# Patient Record
Sex: MALE | Race: OTHER | Employment: UNEMPLOYED | URBAN - METROPOLITAN AREA
[De-identification: names, ages, dates, MRNs, and addresses within clinical notes are randomized per-mention and may not be internally consistent; named-entity substitution may affect disease eponyms.]

---

## 2024-04-23 DIAGNOSIS — Z13.0 SCREENING FOR IRON DEFICIENCY ANEMIA: ICD-10-CM

## 2024-04-23 DIAGNOSIS — Z13.220 LIPID SCREENING: ICD-10-CM

## 2024-04-23 DIAGNOSIS — R73.03 PREDIABETES: ICD-10-CM

## 2024-04-23 DIAGNOSIS — Z12.5 PROSTATE CANCER SCREENING: ICD-10-CM

## 2024-04-23 LAB
ALBUMIN SERPL-MCNC: 4.5 G/DL (ref 3.5–4.6)
ALP SERPL-CCNC: 98 U/L (ref 35–104)
ALT SERPL-CCNC: 18 U/L (ref 0–41)
ANION GAP SERPL CALCULATED.3IONS-SCNC: 12 MEQ/L (ref 9–15)
AST SERPL-CCNC: 19 U/L (ref 0–40)
BASOPHILS # BLD: 0.1 K/UL (ref 0–0.2)
BASOPHILS NFR BLD: 0.7 %
BILIRUB SERPL-MCNC: 0.5 MG/DL (ref 0.2–0.7)
BUN SERPL-MCNC: 14 MG/DL (ref 6–20)
CALCIUM SERPL-MCNC: 9.6 MG/DL (ref 8.5–9.9)
CHLORIDE SERPL-SCNC: 104 MEQ/L (ref 95–107)
CHOLEST SERPL-MCNC: 188 MG/DL (ref 0–199)
CO2 SERPL-SCNC: 28 MEQ/L (ref 20–31)
CREAT SERPL-MCNC: 1.13 MG/DL (ref 0.7–1.2)
EOSINOPHIL # BLD: 0.3 K/UL (ref 0–0.7)
EOSINOPHIL NFR BLD: 2.5 %
ERYTHROCYTE [DISTWIDTH] IN BLOOD BY AUTOMATED COUNT: 12.3 % (ref 11.5–14.5)
GLOBULIN SER CALC-MCNC: 2.6 G/DL (ref 2.3–3.5)
GLUCOSE SERPL-MCNC: 127 MG/DL (ref 70–99)
HCT VFR BLD AUTO: 52.1 % (ref 42–52)
HDLC SERPL-MCNC: 57 MG/DL (ref 40–59)
HGB BLD-MCNC: 17.1 G/DL (ref 14–18)
LDL CHOLESTEROL CALCULATED: 98 MG/DL (ref 0–129)
LYMPHOCYTES # BLD: 2.5 K/UL (ref 1–4.8)
LYMPHOCYTES NFR BLD: 25.7 %
MCH RBC QN AUTO: 31.8 PG (ref 27–31.3)
MCHC RBC AUTO-ENTMCNC: 32.8 % (ref 33–37)
MCV RBC AUTO: 97 FL (ref 79–92.2)
MONOCYTES # BLD: 0.9 K/UL (ref 0.2–0.8)
MONOCYTES NFR BLD: 9.5 %
NEUTROPHILS # BLD: 6 K/UL (ref 1.4–6.5)
NEUTS SEG NFR BLD: 61.4 %
PLATELET # BLD AUTO: 242 K/UL (ref 130–400)
POTASSIUM SERPL-SCNC: 4.5 MEQ/L (ref 3.4–4.9)
PROT SERPL-MCNC: 7.1 G/DL (ref 6.3–8)
PSA SERPL-MCNC: 0.81 NG/ML (ref 0–4)
RBC # BLD AUTO: 5.37 M/UL (ref 4.7–6.1)
SODIUM SERPL-SCNC: 144 MEQ/L (ref 135–144)
TRIGLYCERIDE, FASTING: 163 MG/DL (ref 0–150)
WBC # BLD AUTO: 9.8 K/UL (ref 4.8–10.8)

## 2024-04-29 ENCOUNTER — HOSPITAL ENCOUNTER (OUTPATIENT)
Dept: PHYSICAL THERAPY | Age: 59
Setting detail: THERAPIES SERIES
Discharge: HOME | End: 2024-04-29

## 2024-04-29 PROCEDURE — 97110 THERAPEUTIC EXERCISES: CPT

## 2024-04-29 ASSESSMENT — PAIN DESCRIPTION - PAIN TYPE: TYPE: CHRONIC PAIN

## 2024-04-29 ASSESSMENT — PAIN DESCRIPTION - DESCRIPTORS: DESCRIPTORS: SHARP

## 2024-04-29 ASSESSMENT — PAIN DESCRIPTION - LOCATION: LOCATION: BACK

## 2024-04-29 ASSESSMENT — PAIN DESCRIPTION - ORIENTATION: ORIENTATION: LEFT

## 2024-04-29 ASSESSMENT — PAIN SCALES - GENERAL: PAINLEVEL_OUTOF10: 8

## 2024-04-29 NOTE — PROGRESS NOTES
[place] [address] [facility] [place] [address]  Phone: [phone]      Date: 2024  Patient: Gustavo Villa  : 1965   Confirmed: Yes  MRN: [id number]  Referring Provider: Kaycee Pal PA-C    Medical Diagnosis: Neck pain, musculoskeletal [M54.2]  Paresthesia of left arm [R20.2]  Osteoarthritis of spine with radiculopathy, lumbar region [M47.26]       Treatment Diagnosis: increased pain, decreased pain free ROM & strength, decreased ability to perform functional mobility tasks & ADLs, decreased functional endurance & balance, & impaired postural awareness    Visit Information:  Insurance: Payor: [employer] [place] / Plan: [employer] [place] / Product Type: *No Product type* /   PT Visit Information  Onset Date:  (years)  PT Insurance Information: [employer]  Total # of Visits Approved: 30 (insurance changes 24)  Total # of Visits to Date: 3  No Show: 0  Canceled Appointment: 0  Progress Note Counter: 3/8-    Subjective Information:  Subjective: iPad Interpretter: [employer] #[id number]. Pt reports last session was good. States he has some LBP. Reports it is a sharp pain. Pain is mainly the left side.  HEP Compliance:  [x] Good [] Fair [] Poor [] Reports not doing due to:    Pain Screening  Patient Currently in Pain: Yes  Pain Assessment: 0-10  Pain Level: 8  Pain Type: Chronic pain  Pain Location: Back  Pain Orientation: Left  Pain Descriptors: Sharp    Treatment:  Exercises:  Exercises  Exercise 2: S/L IT band stretch 3 x 20-30\" on L  Exercise 3: Step Ups x 10 (8 in step)  love  Exercise 4: TA iso with bridges x 12-5\"  Exercise 5: LTR x 10 love 5\" holds  Exercise 6: SKTC 3 x20\" love (with towel)  Exercise 7: Pball rollouts x 10 x 5\" flexion/ toward the R/L  Exercise 8: SLR x 10 love -3-5\"  Exercise 9: HIp Abduction x 10 love  Exercise 11: Supine Chest Stretch with towel roll  x 10 -5-10\"  Exercise 20: HEP: cont current + SKTC, SLR     Objective Measures:

## 2024-05-13 ENCOUNTER — HOSPITAL ENCOUNTER (OUTPATIENT)
Dept: PHYSICAL THERAPY | Age: 59
Setting detail: THERAPIES SERIES
Discharge: HOME | End: 2024-05-13
Payer: MEDICAID

## 2024-05-13 NOTE — PROGRESS NOTES
Therapy                            Cancellation/No-show Note      Date: 2024  Patient: Gustavo Coyne (58 y.o. male)  : 1965  MRN:  [id number]  Referring Physician: Kaycee Pal PA-C    Medical Diagnosis: Neck pain, musculoskeletal [M54.2]  Paresthesia of left arm [R20.2]  Osteoarthritis of spine with radiculopathy, lumbar region [M47.26]      Visit Information:  Visits to Date 3   No Show/Cancelled Appts: 0       For today's appointment patient:  [x]  Cancelled  []  Rescheduled appointment  []  No-show   []  Called pt to remind of next appointment     Reason given by patient:  []  Patient ill  [x]  Conflicting appointment  []  No transportation    []  Conflict with work  []  No reason given  []  Other:      [x] Pt has future appointments scheduled, no follow up needed  [] Pt requests to be on hold.    Reason:   If > 2 weeks please discuss with therapist.  [] Therapist to call pt for follow up     Comments:       Signature: Electronically signed by Ernesto Shook PTA on 24 at 2:39 PM EDT

## 2024-05-14 ENCOUNTER — HOSPITAL ENCOUNTER (OUTPATIENT)
Dept: PHYSICAL THERAPY | Age: 59
Setting detail: THERAPIES SERIES
Discharge: HOME | End: 2024-05-14
Payer: MEDICAID

## 2024-05-14 PROCEDURE — 97110 THERAPEUTIC EXERCISES: CPT

## 2024-05-14 PROCEDURE — 97140 MANUAL THERAPY 1/> REGIONS: CPT

## 2024-05-14 ASSESSMENT — PAIN SCALES - GENERAL: PAINLEVEL_OUTOF10: 6

## 2024-05-14 ASSESSMENT — PAIN DESCRIPTION - ORIENTATION: ORIENTATION: RIGHT;LEFT

## 2024-05-14 ASSESSMENT — PAIN DESCRIPTION - DESCRIPTORS: DESCRIPTORS: SQUEEZING;PRESSURE

## 2024-05-14 ASSESSMENT — PAIN DESCRIPTION - LOCATION: LOCATION: NECK;SHOULDER

## 2024-05-14 ASSESSMENT — PAIN DESCRIPTION - PAIN TYPE: TYPE: CHRONIC PAIN

## 2024-05-14 NOTE — PROGRESS NOTES
</=25% VC's with exercises/activities in order to demonstrate improved postural awareness. In progress   LTG 5 The patient will have a decrease in NDI & Oswestry scores >/=6 points in order to increase functional activity tolerance In progress   LTG 6 The patient will self-report consistent ability to stand & ambulate >/= 30 minutes with LRD with minimal to no deviations in order to demonstrate improved functional endurance & gait pattern. In progress   LTG 7 The patient will ascend/descend a flight of stairs with reciprocal stair pattern independently with LRD with rails in order to safely get in/out of house. In progress            Plan:  Frequency/Duration:  Plan  Plan Frequency: 2xs/wk  Plan weeks: 4-6 weeks  Current Treatment Recommendations: Strengthening, ROM, Balance training, Gait training, Stair training, Functional mobility training, Transfer training, Neuromuscular re-education, Safety education & training, Home exercise program, Dry needling, Therapeutic activities, Patient/Caregiver education & training, Manual, ADL/Self-care training, IADL training, Group Therapy, Equipment evaluation, education, & procurement, Pain management, Endurance training, Modalities, Positioning  Modalities: Heat/Cold, Mechanical Traction, Ultrasound, E-stim - unattended, E-stim - manual, Vasopneumatic Device  Pt to continue current HEP.  See objective section for any therapeutic exercise changes, additions or modifications this date.    Therapy Time:      PT Individual Minutes  Time In: 1417  Time Out: 1501  Minutes: 44  Timed Code Treatment Minutes: 44 Minutes  Procedure Minutes: 0  Timed Activity Minutes Units   Ther Ex 33 2   Manual  11 1     Electronically signed by Damian Wilson PTA on 5/14/24 at 3:07 PM EDT

## 2024-05-16 ENCOUNTER — HOSPITAL ENCOUNTER (OUTPATIENT)
Dept: PHYSICAL THERAPY | Age: 59
Setting detail: THERAPIES SERIES
Discharge: HOME | End: 2024-05-16
Payer: MEDICAID

## 2024-05-16 NOTE — PROGRESS NOTES
Therapy                            Cancellation/No-show Note    Date: 2024  Patient: Gustavo Coyne (58 y.o. male)  : 1965  MRN:  [id number]  Referring Physician: Kaycee Pal PA-C    Medical Diagnosis: Neck pain, musculoskeletal [M54.2]  Paresthesia of left arm [R20.2]  Osteoarthritis of spine with radiculopathy, lumbar region [M47.26]      Visit Information:  Insurance: Payor: [employer] / Plan: [employer] [place] [place] / Product Type: *No Product type* /   Visits to Date: 3   No Show/Cancelled Appts:       For today's appointment patient:  []  Cancelled  []  Rescheduled appointment  [x]  No-show   []  Called pt to remind of next appointment     Reason given by patient:  []  Patient ill  []  Conflicting appointment  []  No transportation    []  Conflict with work  []  No reason given  []  Other:      [x] Pt has future appointments scheduled, no follow up needed  [] Pt requests to be on hold.    Reason:   If > 2 weeks please discuss with therapist.  [] Therapist to call pt for follow up     Comments:   pt is Mongolian speaking    Signature: Electronically signed by Pinky Fernández PT on 24 at 11:27 AM EDT

## 2024-05-17 RX ORDER — POLYETHYLENE GLYCOL 3350, SODIUM CHLORIDE, SODIUM BICARBONATE, POTASSIUM CHLORIDE 420; 11.2; 5.72; 1.48 G/4L; G/4L; G/4L; G/4L
4000 POWDER, FOR SOLUTION ORAL ONCE
Qty: 4000 ML | Refills: 0 | Status: SHIPPED | OUTPATIENT
Start: 2024-05-17 | End: 2024-05-17

## 2024-05-21 ENCOUNTER — HOSPITAL ENCOUNTER (OUTPATIENT)
Dept: PHYSICAL THERAPY | Age: 59
Setting detail: THERAPIES SERIES
Discharge: HOME | End: 2024-05-21
Payer: MEDICAID

## 2024-05-21 PROCEDURE — 97140 MANUAL THERAPY 1/> REGIONS: CPT

## 2024-05-21 PROCEDURE — 97110 THERAPEUTIC EXERCISES: CPT

## 2024-05-21 ASSESSMENT — PAIN SCALES - GENERAL: PAINLEVEL_OUTOF10: 6

## 2024-05-21 ASSESSMENT — PAIN DESCRIPTION - LOCATION: LOCATION: NECK;SHOULDER

## 2024-05-23 ENCOUNTER — HOSPITAL ENCOUNTER (OUTPATIENT)
Dept: PHYSICAL THERAPY | Age: 59
Setting detail: THERAPIES SERIES
Discharge: HOME | End: 2024-05-23
Payer: MEDICAID

## 2024-05-23 PROCEDURE — 97140 MANUAL THERAPY 1/> REGIONS: CPT

## 2024-05-23 PROCEDURE — 97110 THERAPEUTIC EXERCISES: CPT

## 2024-05-23 ASSESSMENT — PAIN DESCRIPTION - DESCRIPTORS: DESCRIPTORS: STABBING;DISCOMFORT

## 2024-05-23 ASSESSMENT — PAIN DESCRIPTION - ORIENTATION: ORIENTATION: MID

## 2024-05-23 ASSESSMENT — PAIN DESCRIPTION - PAIN TYPE: TYPE: CHRONIC PAIN

## 2024-05-23 ASSESSMENT — PAIN SCALES - GENERAL: PAINLEVEL_OUTOF10: 5

## 2024-05-23 ASSESSMENT — PAIN DESCRIPTION - LOCATION: LOCATION: NECK

## 2024-05-23 NOTE — PROGRESS NOTES
pec stretch (arms in horz abduction): 20\" x 5  Exercise 20: HEP: cont current       Manual:   Manual Therapy  Soft Tissue Mobilizaton: left upper trap and periscapulars x 8 min  Other: cervical PROM flex, flex w/ rot, love SB, ext, and ext w/ rot x 2 min  Treatment Reasoning  Limitations addressed: Joint motion, Tissue extensibility, Painful spasm      Objective Measures:        LTG 1 Current Status:: 5/23/24: on-going, compliant with current  HEP  LTG 2 Current Status:: 5/23/24: Cervical ROM WFL, with discomfort felt with right sided rotation.       Assessment:   Body Structures, Functions, Activity Limitations Requiring Skilled Therapeutic Intervention: Decreased functional mobility , Decreased ADL status, Decreased endurance, Increased pain, Decreased ROM, Decreased strength, Decreased posture, Decreased balance  Assessment: Continues to reports decreasing pain levels with manual techniques and mobility exercises performed in doorway. Displays  increased tightness in his cervical paraspinals and upper traps. Notes pain decreased today from a 5/10 to 3/10. Notes slight sharp pains in L side of cervical spine with R cervical rotation.  Treatment Diagnosis: increased pain, decreased pain free ROM & strength, decreased ability to perform functional mobility tasks & ADLs, decreased functional endurance & balance, & impaired postural awareness  Therapy Prognosis: Fair, Good       Patient Education: [x] NA       Post-Pain Assessment:       Pain Rating (0-10 pain scale):   3/10   Location and pain description same as pre-treatment unless indicated.   Action: [x] NA   [] Perform HEP  [] Meds as prescribed  [] Modalities as prescribed   [] Call Physician     GOALS   Patient Goal(s): Patient Goals : 'don't feel pain'    Long Term Goals Completed by 4-6 weeks Goal Status   LTG 1 Patient will be independent/compliant with PT HEP in order to demonstrate self management of symptoms upon D/C In progress   LTG 2 The patient will

## 2024-05-28 ENCOUNTER — HOSPITAL ENCOUNTER (OUTPATIENT)
Dept: PHYSICAL THERAPY | Age: 59
Setting detail: THERAPIES SERIES
Discharge: HOME | End: 2024-05-28
Payer: MEDICAID

## 2024-05-28 ENCOUNTER — PREP FOR PROCEDURE (OUTPATIENT)
Dept: GASTROENTEROLOGY | Age: 59
End: 2024-05-28

## 2024-05-28 PROCEDURE — 97110 THERAPEUTIC EXERCISES: CPT

## 2024-05-28 PROCEDURE — 97140 MANUAL THERAPY 1/> REGIONS: CPT

## 2024-05-28 RX ORDER — SODIUM CHLORIDE 9 MG/ML
INJECTION, SOLUTION INTRAVENOUS CONTINUOUS
Status: CANCELLED | OUTPATIENT
Start: 2024-05-28

## 2024-05-28 RX ORDER — SODIUM CHLORIDE 0.9 % (FLUSH) 0.9 %
5-40 SYRINGE (ML) INJECTION EVERY 12 HOURS SCHEDULED
Status: CANCELLED | OUTPATIENT
Start: 2024-05-28

## 2024-05-28 RX ORDER — SODIUM CHLORIDE 9 MG/ML
INJECTION, SOLUTION INTRAVENOUS PRN
Status: CANCELLED | OUTPATIENT
Start: 2024-05-28

## 2024-05-28 RX ORDER — SODIUM CHLORIDE 0.9 % (FLUSH) 0.9 %
5-40 SYRINGE (ML) INJECTION PRN
Status: CANCELLED | OUTPATIENT
Start: 2024-05-28

## 2024-05-28 ASSESSMENT — PAIN DESCRIPTION - DESCRIPTORS: DESCRIPTORS: STABBING

## 2024-05-28 ASSESSMENT — PAIN SCALES - GENERAL: PAINLEVEL_OUTOF10: 6

## 2024-05-28 ASSESSMENT — PAIN DESCRIPTION - ORIENTATION: ORIENTATION: MID

## 2024-05-28 ASSESSMENT — PAIN DESCRIPTION - PAIN TYPE: TYPE: CHRONIC PAIN

## 2024-05-28 ASSESSMENT — PAIN DESCRIPTION - LOCATION: LOCATION: NECK

## 2024-05-28 NOTE — PROGRESS NOTES
horz abduction): 20\" x 5  Exercise 12: Cervical AROM 5\" x 10 each (Sb/Ext)  Exercise 14: Thoracic rotation seated x 10 x 5\"  Exercise 20: HEP: cont current       Manual:   Manual Therapy  Soft Tissue Mobilizaton: left upper trap and periscapulars x 8 min  Treatment Reasoning  Limitations addressed: Joint motion, Tissue extensibility, Painful spasm       *Indicates exercise, modality, or manual techniques to be initiated when appropriate    Objective Measures:        Assessment:   Body Structures, Functions, Activity Limitations Requiring Skilled Therapeutic Intervention: Decreased functional mobility , Decreased ADL status, Decreased endurance, Increased pain, Decreased ROM, Decreased strength, Decreased posture, Decreased balance  Assessment: Light modifications/ progressions made this date with good results, Gustavo reports decreasing pain throughout tx with activities and greater relief from manual techniques.  Manual had mod pain relief at conclusion of tx.  Treatment Diagnosis: increased pain, decreased pain free ROM & strength, decreased ability to perform functional mobility tasks & ADLs, decreased functional endurance & balance, & impaired postural awareness  Therapy Prognosis: Fair, Good       Patient Education: [x] NA       Post-Pain Assessment:       Pain Rating (0-10 pain scale):   2-3/10   Location and pain description same as pre-treatment unless indicated.   Action: [] NA   [x] Perform HEP  [] Meds as prescribed  [] Modalities as prescribed   [] Call Physician     GOALS   Patient Goal(s): Patient Goals : 'don't feel pain'       Long Term Goals Completed by 4-6 weeks Goal Status   LTG 1 Patient will be independent/compliant with PT HEP in order to demonstrate self management of symptoms upon D/C In progress   LTG 2 The patient will demo improved cervical, lumbar, & bilateral hip AROM >/=5-10* in order to increase ease with functional mobility tasks & ADL's In progress   LTG 3 The patient will

## 2024-05-30 ENCOUNTER — OFFICE VISIT (OUTPATIENT)
Dept: FAMILY MEDICINE CLINIC | Age: 59
End: 2024-05-30
Payer: MEDICAID

## 2024-05-30 ENCOUNTER — TELEPHONE (OUTPATIENT)
Dept: FAMILY MEDICINE CLINIC | Age: 59
End: 2024-05-30

## 2024-05-30 ENCOUNTER — HOSPITAL ENCOUNTER (OUTPATIENT)
Dept: PHYSICAL THERAPY | Age: 59
Setting detail: THERAPIES SERIES
Discharge: HOME | End: 2024-05-30
Payer: MEDICAID

## 2024-05-30 VITALS
HEART RATE: 72 BPM | DIASTOLIC BLOOD PRESSURE: 80 MMHG | HEIGHT: 66 IN | WEIGHT: 204 LBS | OXYGEN SATURATION: 97 % | SYSTOLIC BLOOD PRESSURE: 125 MMHG | BODY MASS INDEX: 32.78 KG/M2 | TEMPERATURE: 97.6 F

## 2024-05-30 DIAGNOSIS — M54.9 MID BACK PAIN: ICD-10-CM

## 2024-05-30 DIAGNOSIS — G89.29 CHRONIC PAIN OF RIGHT KNEE: ICD-10-CM

## 2024-05-30 DIAGNOSIS — M54.2 NECK PAIN, MUSCULOSKELETAL: Primary | ICD-10-CM

## 2024-05-30 DIAGNOSIS — R20.2 PARESTHESIA OF LEFT ARM: ICD-10-CM

## 2024-05-30 DIAGNOSIS — M25.561 CHRONIC PAIN OF RIGHT KNEE: ICD-10-CM

## 2024-05-30 DIAGNOSIS — M25.512 ACUTE PAIN OF BOTH SHOULDERS: ICD-10-CM

## 2024-05-30 DIAGNOSIS — M25.50 ARTHRALGIA, UNSPECIFIED JOINT: ICD-10-CM

## 2024-05-30 DIAGNOSIS — R26.9 ABNORMALITY OF GAIT AND MOBILITY: ICD-10-CM

## 2024-05-30 DIAGNOSIS — M47.26 OSTEOARTHRITIS OF SPINE WITH RADICULOPATHY, LUMBAR REGION: ICD-10-CM

## 2024-05-30 DIAGNOSIS — M17.11 PRIMARY OSTEOARTHRITIS OF RIGHT KNEE: Primary | ICD-10-CM

## 2024-05-30 DIAGNOSIS — M25.511 ACUTE PAIN OF BOTH SHOULDERS: ICD-10-CM

## 2024-05-30 LAB
REASON FOR REJECTION: NORMAL
REJECTED TEST: NORMAL

## 2024-05-30 PROCEDURE — G8427 DOCREV CUR MEDS BY ELIG CLIN: HCPCS | Performed by: PHYSICIAN ASSISTANT

## 2024-05-30 PROCEDURE — 3017F COLORECTAL CA SCREEN DOC REV: CPT | Performed by: PHYSICIAN ASSISTANT

## 2024-05-30 PROCEDURE — 1036F TOBACCO NON-USER: CPT | Performed by: PHYSICIAN ASSISTANT

## 2024-05-30 PROCEDURE — 99214 OFFICE O/P EST MOD 30 MIN: CPT | Performed by: PHYSICIAN ASSISTANT

## 2024-05-30 PROCEDURE — G8417 CALC BMI ABV UP PARAM F/U: HCPCS | Performed by: PHYSICIAN ASSISTANT

## 2024-05-30 PROCEDURE — 96372 THER/PROPH/DIAG INJ SC/IM: CPT | Performed by: PHYSICIAN ASSISTANT

## 2024-05-30 RX ORDER — TIZANIDINE 4 MG/1
4 TABLET ORAL EVERY 8 HOURS PRN
Qty: 40 TABLET | Refills: 1 | Status: SHIPPED | OUTPATIENT
Start: 2024-05-30 | End: 2024-05-30 | Stop reason: SDUPTHER

## 2024-05-30 RX ORDER — TIZANIDINE 4 MG/1
4 TABLET ORAL EVERY 8 HOURS PRN
Qty: 40 TABLET | Refills: 1 | Status: SHIPPED | OUTPATIENT
Start: 2024-05-30 | End: 2024-05-31

## 2024-05-30 RX ORDER — KETOROLAC TROMETHAMINE 30 MG/ML
60 INJECTION, SOLUTION INTRAMUSCULAR; INTRAVENOUS ONCE
Status: COMPLETED | OUTPATIENT
Start: 2024-05-30 | End: 2024-05-30

## 2024-05-30 RX ORDER — HYDROCODONE BITARTRATE AND ACETAMINOPHEN 5; 325 MG/1; MG/1
1 TABLET ORAL EVERY 6 HOURS PRN
Qty: 28 TABLET | Refills: 0 | Status: SHIPPED | OUTPATIENT
Start: 2024-05-30 | End: 2024-06-06

## 2024-05-30 RX ADMIN — KETOROLAC TROMETHAMINE 60 MG: 30 INJECTION, SOLUTION INTRAMUSCULAR; INTRAVENOUS at 12:40

## 2024-05-30 ASSESSMENT — ENCOUNTER SYMPTOMS: BACK PAIN: 1

## 2024-05-30 NOTE — PROGRESS NOTES
Therapy                            Cancellation/No-show Note      Date: 2024  Patient: Gustavo Coyne (58 y.o. male)  : 1965  MRN:  [id number]  Referring Physician: Kaycee Pal PA-C    Medical Diagnosis: Neck pain, musculoskeletal [M54.2]  Paresthesia of left arm [R20.2]  Osteoarthritis of spine with radiculopathy, lumbar region [M47.26]      Visit Information:  Visits to Date 6   No Show/Cancelled Appts:       For today's appointment patient:  [x]  Cancelled  []  Rescheduled appointment  []  No-show   []  Called pt to remind of next appointment     Reason given by patient:  []  Patient ill  []  Conflicting appointment  []  No transportation    []  Conflict with work  []  No reason given  [x]  Other: Pt arrived to appt with daughter and reports that he fell yesterday and is having increased pain in back, neck, and arms. Pt reports he fell forward landing on his arms and torri his neck. Pt has appt with MD at 1145. After discussion with pt and daughter decided to cx appt this date until seeing MD d/t increased pain following fall. Pt to contact dept after appt to f/u after appt.      [x] Pt has future appointments scheduled, no follow up needed  [] Pt requests to be on hold.    Reason:   If > 2 weeks please discuss with therapist.  [] Therapist to call pt for follow up     Comments:       Signature: Electronically signed by Sylvia Bean PT on 24 at 11:08 AM EDT

## 2024-05-30 NOTE — PROGRESS NOTES
DISCONTD: tiZANidine (ZANAFLEX) 4 MG tablet     Sig: Take 1 tablet by mouth every 8 hours as needed (muscle spasm)     Dispense:  40 tablet     Refill:  1    HYDROcodone-acetaminophen (NORCO) 5-325 MG per tablet     Sig: Take 1 tablet by mouth every 6 hours as needed for Pain for up to 7 days. Intended supply: 7 days. Take lowest dose possible to manage pain Max Daily Amount: 4 tablets     Dispense:  28 tablet     Refill:  0     Reduce doses taken as pain becomes manageable    tiZANidine (ZANAFLEX) 4 MG tablet     Sig: Take 1 tablet by mouth every 8 hours as needed (muscle spasm)     Dispense:  40 tablet     Refill:  1     Medications Discontinued During This Encounter   Medication Reason    tiZANidine (ZANAFLEX) 4 MG tablet REORDER    tiZANidine (ZANAFLEX) 4 MG tablet REORDER     No follow-ups on file.    Kaycee Pal PA-C

## 2024-05-30 NOTE — TELEPHONE ENCOUNTER
Geoff from [facility] called to let PCP know that pt needs to get ESR re done due to specimen being clotted.

## 2024-05-31 ENCOUNTER — TELEPHONE (OUTPATIENT)
Dept: FAMILY MEDICINE CLINIC | Age: 59
End: 2024-05-31

## 2024-05-31 DIAGNOSIS — M62.830 MUSCLE SPASM OF BACK: Primary | ICD-10-CM

## 2024-05-31 LAB
ERYTHROCYTE [SEDIMENTATION RATE] IN BLOOD BY WESTERGREN METHOD: 1 MM (ref 0–20)
RHEUMATOID FACTOR: <10 IU/ML (ref 0–13)

## 2024-05-31 RX ORDER — CYCLOBENZAPRINE HCL 10 MG
10 TABLET ORAL 3 TIMES DAILY PRN
Qty: 40 TABLET | Refills: 1 | Status: SHIPPED | OUTPATIENT
Start: 2024-05-31 | End: 2024-06-10

## 2024-05-31 RX ORDER — CYCLOBENZAPRINE HCL 10 MG
10 TABLET ORAL 3 TIMES DAILY PRN
Qty: 40 TABLET | Refills: 1 | Status: SHIPPED | OUTPATIENT
Start: 2024-05-31 | End: 2024-05-31 | Stop reason: SDUPTHER

## 2024-05-31 NOTE — TELEPHONE ENCOUNTER
nAia from the pharmacy called and stated that they need to hear from the office in regards to a possible drug interaction between hydrocodone and zanaflex   States there could be a reaction to the central nervous system and depression.   Please advise.   Thank you

## 2024-06-01 LAB — NUCLEAR IGG SER QL IA: NORMAL

## 2024-06-04 ENCOUNTER — HOSPITAL ENCOUNTER (OUTPATIENT)
Dept: PHYSICAL THERAPY | Age: 59
Setting detail: THERAPIES SERIES
Discharge: HOME | End: 2024-06-04

## 2024-06-04 DIAGNOSIS — G89.29 CHRONIC PAIN OF RIGHT KNEE: Primary | ICD-10-CM

## 2024-06-04 DIAGNOSIS — R26.9 ABNORMALITY OF GAIT AND MOBILITY: ICD-10-CM

## 2024-06-04 DIAGNOSIS — M25.561 CHRONIC PAIN OF RIGHT KNEE: Primary | ICD-10-CM

## 2024-06-04 DIAGNOSIS — M47.26 OSTEOARTHRITIS OF SPINE WITH RADICULOPATHY, LUMBAR REGION: ICD-10-CM

## 2024-06-04 NOTE — PROGRESS NOTES
Therapy                            Cancellation/No-show Note      Date: 2024  Patient: Gustavo Coyne (58 y.o. male)  : 1965  MRN:  [id number]  Referring Physician: Kaycee Pal PA-C    Medical Diagnosis: Neck pain, musculoskeletal [M54.2]  Paresthesia of left arm [R20.2]  Osteoarthritis of spine with radiculopathy, lumbar region [M47.26]      Visit Information:  Visits to Date 6   No Show/Cancelled Appts:       For today's appointment patient:  [x]  Cancelled  []  Rescheduled appointment  []  No-show   []  Called pt to remind of next appointment     Reason given by patient:  []  Patient ill  []  Conflicting appointment  []  No transportation    []  Conflict with work  []  No reason given  [x]  Other:  MD wants pt to have home healthcare PT. Called to discuss with pt and daughter reports she is unsure if he is getting home health PT and requests to be placed on hold vs d/C until it is figured out.     [] Pt has future appointments scheduled, no follow up needed  [] Pt requests to be on hold.    Reason:   If > 2 weeks please discuss with therapist.  [] Therapist to call pt for follow up     Comments:       Signature: Electronically signed by Sylvia Bean PT on 24 at 4:50 PM EDT

## 2024-06-06 ENCOUNTER — HOSPITAL ENCOUNTER (OUTPATIENT)
Age: 59
Setting detail: OUTPATIENT SURGERY
Discharge: HOME | End: 2024-06-06
Attending: SPECIALIST | Admitting: SPECIALIST
Payer: MEDICAID

## 2024-06-06 ENCOUNTER — ANESTHESIA EVENT (OUTPATIENT)
Dept: ENDOSCOPY | Age: 59
End: 2024-06-06
Payer: MEDICAID

## 2024-06-06 ENCOUNTER — ANESTHESIA (OUTPATIENT)
Dept: ENDOSCOPY | Age: 59
End: 2024-06-06
Payer: MEDICAID

## 2024-06-06 VITALS
OXYGEN SATURATION: 96 % | DIASTOLIC BLOOD PRESSURE: 60 MMHG | BODY MASS INDEX: 32.02 KG/M2 | HEART RATE: 91 BPM | SYSTOLIC BLOOD PRESSURE: 110 MMHG | RESPIRATION RATE: 20 BRPM | TEMPERATURE: 97.3 F | WEIGHT: 204 LBS | HEIGHT: 67 IN

## 2024-06-06 PROBLEM — Z12.11 SCREENING FOR MALIGNANT NEOPLASM OF COLON: Status: ACTIVE | Noted: 2024-06-06

## 2024-06-06 PROCEDURE — 7100000010 HC PHASE II RECOVERY - FIRST 15 MIN: Performed by: SPECIALIST

## 2024-06-06 PROCEDURE — 3700000001 HC ADD 15 MINUTES (ANESTHESIA): Performed by: SPECIALIST

## 2024-06-06 PROCEDURE — 6360000002 HC RX W HCPCS: Performed by: NURSE ANESTHETIST, CERTIFIED REGISTERED

## 2024-06-06 PROCEDURE — 2580000003 HC RX 258: Performed by: SPECIALIST

## 2024-06-06 PROCEDURE — 2580000003 HC RX 258

## 2024-06-06 PROCEDURE — 7100000011 HC PHASE II RECOVERY - ADDTL 15 MIN: Performed by: SPECIALIST

## 2024-06-06 PROCEDURE — 2500000003 HC RX 250 WO HCPCS: Performed by: NURSE ANESTHETIST, CERTIFIED REGISTERED

## 2024-06-06 PROCEDURE — 2709999900 HC NON-CHARGEABLE SUPPLY: Performed by: SPECIALIST

## 2024-06-06 PROCEDURE — 3609027000 HC COLONOSCOPY: Performed by: SPECIALIST

## 2024-06-06 PROCEDURE — 45378 DIAGNOSTIC COLONOSCOPY: CPT | Performed by: SPECIALIST

## 2024-06-06 PROCEDURE — 3700000000 HC ANESTHESIA ATTENDED CARE: Performed by: SPECIALIST

## 2024-06-06 RX ORDER — LIDOCAINE HYDROCHLORIDE 20 MG/ML
INJECTION, SOLUTION INFILTRATION; PERINEURAL PRN
Status: DISCONTINUED | OUTPATIENT
Start: 2024-06-06 | End: 2024-06-06 | Stop reason: SDUPTHER

## 2024-06-06 RX ORDER — PROPOFOL 10 MG/ML
INJECTION, EMULSION INTRAVENOUS PRN
Status: DISCONTINUED | OUTPATIENT
Start: 2024-06-06 | End: 2024-06-06 | Stop reason: SDUPTHER

## 2024-06-06 RX ORDER — SODIUM CHLORIDE 0.9 % (FLUSH) 0.9 %
5-40 SYRINGE (ML) INJECTION EVERY 12 HOURS SCHEDULED
Status: DISCONTINUED | OUTPATIENT
Start: 2024-06-06 | End: 2024-06-06 | Stop reason: HOSPADM

## 2024-06-06 RX ORDER — SODIUM CHLORIDE 0.9 % (FLUSH) 0.9 %
5-40 SYRINGE (ML) INJECTION PRN
Status: DISCONTINUED | OUTPATIENT
Start: 2024-06-06 | End: 2024-06-06 | Stop reason: HOSPADM

## 2024-06-06 RX ORDER — SODIUM CHLORIDE 9 MG/ML
INJECTION, SOLUTION INTRAVENOUS
Status: COMPLETED
Start: 2024-06-06 | End: 2024-06-06

## 2024-06-06 RX ORDER — SODIUM CHLORIDE 9 MG/ML
INJECTION, SOLUTION INTRAVENOUS PRN
Status: DISCONTINUED | OUTPATIENT
Start: 2024-06-06 | End: 2024-06-06 | Stop reason: HOSPADM

## 2024-06-06 RX ORDER — SODIUM CHLORIDE 9 MG/ML
INJECTION, SOLUTION INTRAVENOUS CONTINUOUS
Status: DISCONTINUED | OUTPATIENT
Start: 2024-06-06 | End: 2024-06-06 | Stop reason: HOSPADM

## 2024-06-06 RX ADMIN — PROPOFOL 100 MG: 10 INJECTION, EMULSION INTRAVENOUS at 11:24

## 2024-06-06 RX ADMIN — LIDOCAINE HYDROCHLORIDE 60 MG: 20 INJECTION, SOLUTION INFILTRATION; PERINEURAL at 11:24

## 2024-06-06 RX ADMIN — SODIUM CHLORIDE: 9 INJECTION, SOLUTION INTRAVENOUS at 10:02

## 2024-06-06 RX ADMIN — PROPOFOL 100 MG: 10 INJECTION, EMULSION INTRAVENOUS at 11:33

## 2024-06-06 RX ADMIN — PROPOFOL 100 MG: 10 INJECTION, EMULSION INTRAVENOUS at 11:27

## 2024-06-06 ASSESSMENT — PAIN - FUNCTIONAL ASSESSMENT
PAIN_FUNCTIONAL_ASSESSMENT: NONE - DENIES PAIN
PAIN_FUNCTIONAL_ASSESSMENT: 0-10
PAIN_FUNCTIONAL_ASSESSMENT: ACTIVITIES ARE NOT PREVENTED

## 2024-06-06 ASSESSMENT — PAIN DESCRIPTION - DESCRIPTORS: DESCRIPTORS: CRAMPING

## 2024-06-06 NOTE — H&P
Patient Name: Gustavo Coyne  : 1965  MRN: [id number]  DATE: 24      ENDOSCOPY  History and Physical    Procedure:    [] Diagnostic Colonoscopy       [x] Screening Colonoscopy  [] EGD      [] ERCP      [] EUS       [] Other    [x] Previous office notes/History and Physical reviewed from the patients chart. Please see EMR for further details of HPI. I have examined the patient's status immediately prior to the procedure and:      Indications/HPI:    []Abdominal Pain  []Cancer- GI/Lung  []Fhx of colon CA/polyps  []History of Polyps  []Tavarez’s   []Melena  []Abnormal Imaging  []Dysphagia    []Persistent Pneumonia  []Anemia  []Food Impaction  []History of Polyps  []GI Bleed  []Pulmonary nodule/Mass  []Change in bowel habits []Heartburn/Reflux  []Rectal Bleed (BRBPR)  []Chest Pain - Non Cardiac []Heme (+) Stoo  l[]Ulcers  []Constipation  []Hemoptysis   []Varices  []Diarrhea  []Hypoxemia  []Nausea/Vomiting  []Screening   []Crohns/Colitis  []Other:     Anesthesia:   [x] MAC [] Moderate Sedation   [] General   [] None     ROS: 12 pt Review of Symptoms was negative unless mentioned above    Medications:   Prior to Admission medications    Medication Sig Start Date End Date Taking? Authorizing Provider   cyclobenzaprine (FLEXERIL) 10 MG tablet Take 1 tablet by mouth 3 times daily as needed for Muscle spasms 5/31/24 6/10/24  Kaycee Pal PA-C   HYDROcodone-acetaminophen (NORCO) 5-325 MG per tablet Take 1 tablet by mouth every 6 hours as needed for Pain for up to 7 days. Intended supply: 7 days. Take lowest dose possible to manage pain Max Daily Amount: 4 tablets 24  Kaycee Pal PA-C   gabapentin (NEURONTIN) 800 MG tablet Take 1 tablet by mouth 3 times daily. TAKE 1 TABLET BY MOUTH ONCE DAILY.    Provider, MD Cammie   escitalopram (LEXAPRO) 10 MG tablet Take 1 tablet by mouth daily 24   Kaycee Pal PA-C       Allergies: No Known Allergies     History of

## 2024-06-06 NOTE — ANESTHESIA PRE PROCEDURE
Department of Anesthesiology  Preprocedure Note       Name:  Gustavo Coyne   Age:  58 y.o.  :  1965                                          MRN:  [id number]         Date:  2024      Surgeon: Surgeon(s):  Yesenia To MD    Procedure: Procedure(s):  COLORECTAL CANCER SCREENING, NOT HIGH RISK    Medications prior to admission:   Prior to Admission medications    Medication Sig Start Date End Date Taking? Authorizing Provider   cyclobenzaprine (FLEXERIL) 10 MG tablet Take 1 tablet by mouth 3 times daily as needed for Muscle spasms 5/31/24 6/10/24  Kaycee Pal PA-C   HYDROcodone-acetaminophen (NORCO) 5-325 MG per tablet Take 1 tablet by mouth every 6 hours as needed for Pain for up to 7 days. Intended supply: 7 days. Take lowest dose possible to manage pain Max Daily Amount: 4 tablets 24  Kaycee Pal PA-C   gabapentin (NEURONTIN) 800 MG tablet Take 1 tablet by mouth 3 times daily. TAKE 1 TABLET BY MOUTH ONCE DAILY.    Provider, MD Cammie   escitalopram (LEXAPRO) 10 MG tablet Take 1 tablet by mouth daily 24   Kaycee Pal PA-C       Current medications:    Current Facility-Administered Medications   Medication Dose Route Frequency Provider Last Rate Last Admin    0.9 % sodium chloride infusion   IntraVENous Continuous Yesenia To MD 75 mL/hr at 24 1002 New Bag at 24 1002    sodium chloride flush 0.9 % injection 5-40 mL  5-40 mL IntraVENous 2 times per day Yesenia To MD        sodium chloride flush 0.9 % injection 5-40 mL  5-40 mL IntraVENous PRN Yesenia To MD        0.9 % sodium chloride infusion   IntraVENous PRN Yesenia To MD           Allergies:  No Known Allergies    Problem List:  There is no problem list on file for this patient.      Past Medical History:        Diagnosis Date    Degenerative joint disease (DJD) of lumbar spine     History of 2019 novel coronavirus disease (COVID-19)

## 2024-06-06 NOTE — ANESTHESIA POSTPROCEDURE EVALUATION
Department of Anesthesiology  Postprocedure Note    Patient: Gustavo Coyne  MRN: [id number]  YOB: 1965  Date of evaluation: 6/6/2024    Procedure Summary       Date: 06/06/24 Room / Location: [facility] OR 01 / [facility]    Anesthesia Start: 1121 Anesthesia Stop: 1138    Procedure: COLORECTAL CANCER SCREENING, NOT HIGH RISK Diagnosis:       Colon cancer screening      (Colon cancer screening [Z12.11])    Surgeons: Yesenia To MD Responsible Provider: Lula Ramirez APRN - CRNA    Anesthesia Type: general ASA Status: 2            Anesthesia Type: No value filed.    Chandler Phase I: Chandler Score: 10    Chandler Phase II:      Anesthesia Post Evaluation    Patient location during evaluation: bedside  Patient participation: waiting for patient participation  Level of consciousness: awake and responsive to light touch  Airway patency: patent  Nausea & Vomiting: no nausea and no vomiting  Cardiovascular status: blood pressure returned to baseline and hemodynamically stable  Respiratory status: acceptable  Hydration status: euvolemic  Pain management: adequate        No notable events documented.

## 2024-06-25 ENCOUNTER — OFFICE VISIT (OUTPATIENT)
Dept: FAMILY MEDICINE CLINIC | Age: 59
End: 2024-06-25
Payer: MEDICAID

## 2024-06-25 VITALS
OXYGEN SATURATION: 97 % | SYSTOLIC BLOOD PRESSURE: 116 MMHG | DIASTOLIC BLOOD PRESSURE: 78 MMHG | BODY MASS INDEX: 32.78 KG/M2 | TEMPERATURE: 97.5 F | WEIGHT: 204 LBS | HEIGHT: 66 IN | HEART RATE: 86 BPM

## 2024-06-25 DIAGNOSIS — M25.561 CHRONIC PAIN OF RIGHT KNEE: Primary | ICD-10-CM

## 2024-06-25 DIAGNOSIS — M54.2 NECK PAIN, MUSCULOSKELETAL: ICD-10-CM

## 2024-06-25 DIAGNOSIS — G89.29 CHRONIC PAIN OF RIGHT KNEE: Primary | ICD-10-CM

## 2024-06-25 DIAGNOSIS — M47.26 OSTEOARTHRITIS OF SPINE WITH RADICULOPATHY, LUMBAR REGION: ICD-10-CM

## 2024-06-25 DIAGNOSIS — R26.9 ABNORMALITY OF GAIT AND MOBILITY: ICD-10-CM

## 2024-06-25 DIAGNOSIS — F41.8 ANXIETY WITH DEPRESSION: ICD-10-CM

## 2024-06-25 PROCEDURE — G8427 DOCREV CUR MEDS BY ELIG CLIN: HCPCS | Performed by: PHYSICIAN ASSISTANT

## 2024-06-25 PROCEDURE — 99213 OFFICE O/P EST LOW 20 MIN: CPT | Performed by: PHYSICIAN ASSISTANT

## 2024-06-25 PROCEDURE — G8417 CALC BMI ABV UP PARAM F/U: HCPCS | Performed by: PHYSICIAN ASSISTANT

## 2024-06-25 PROCEDURE — 3017F COLORECTAL CA SCREEN DOC REV: CPT | Performed by: PHYSICIAN ASSISTANT

## 2024-06-25 PROCEDURE — 1036F TOBACCO NON-USER: CPT | Performed by: PHYSICIAN ASSISTANT

## 2024-06-25 RX ORDER — TIZANIDINE 4 MG/1
4 TABLET ORAL 3 TIMES DAILY
COMMUNITY
Start: 2024-06-18

## 2024-06-25 NOTE — PROGRESS NOTES
Subjective  Gustavo Coyne, 58 y.o. male presents today with:  Chief Complaint   Patient presents with    Waiver for LTC.     Patient presents today requesting long term services.        HPI  Patient is here accompanied by his daughter-in-law who provided translation patient primarily speaks Khmer  Has noticed improvement in neck ,  low back, and knee pain with physical therapy needs order to  continue out pt  therapy -insurance will not cover in home therapy  Gabapentin and zanaflex are helpful for pain relief  He is receiving home health 14hrs/wk  States he feels less irritable with Lexapro-however he is having some family discord and would like counseling    Review of Systems      Past Medical History:   Diagnosis Date    Degenerative joint disease (DJD) of lumbar spine 2010    History of 2019 novel coronavirus disease (COVID-19) 2023    Prediabetes 11/2023     Past Surgical History:   Procedure Laterality Date    COLONOSCOPY N/A 6/6/2024    COLORECTAL CANCER SCREENING, NOT HIGH RISK performed by Yesenia To MD at [facility]     Social History     Socioeconomic History    Marital status: Single     Spouse name: Not on file    Number of children: Not on file    Years of education: Not on file    Highest education level: Not on file   Occupational History    Not on file   Tobacco Use    Smoking status: Never     Passive exposure: Never    Smokeless tobacco: Never   Vaping Use    Vaping Use: Never used   Substance and Sexual Activity    Alcohol use: Yes     Comment: occ    Drug use: Never    Sexual activity: Not on file   Other Topics Concern    Not on file   Social History Narrative    Not on file     Social Determinants of Health     Financial Resource Strain: Low Risk  (4/22/2024)    Overall Financial Resource Strain (CARDIA)     Difficulty of Paying Living Expenses: Not hard at all   Food Insecurity: No Food Insecurity (4/22/2024)    Hunger Vital Sign     Worried About Running Out of Food

## 2024-06-27 ENCOUNTER — HOSPITAL ENCOUNTER (OUTPATIENT)
Dept: PHYSICAL THERAPY | Age: 59
Setting detail: THERAPIES SERIES
Discharge: HOME | End: 2024-06-27
Payer: MEDICAID

## 2024-06-27 PROCEDURE — 97163 PT EVAL HIGH COMPLEX 45 MIN: CPT

## 2024-06-27 NOTE — PLAN OF CARE
as of 6/25/24: 1.689 m (5' 6.5\").    Weight as of 6/25/24: 92.5 kg (204 lb).     Observations: Forward head, Rounded shoulders, Protracted Scapula, Increased thoracic kyphosis , Flattened lumbar lordosis , Posterior pelvic tilt    Gait     Description: antalgic gait L LE  Distance: within dept clinical distances   Level of Assistance: independence  Assistive Device: None  Comments: Pt reports primary use of SPC d/t pain though did not bring to appt this date      Balance Screen: BRISENO 38/56    Bed Mobility: modified independence  Comments:increased time/effort required to perform with increased pain     Transfers: modified independence  Comments: increased time/effort required to perform with increased pain     Range of Motion  AROM RUE (degrees)  RUE General AROM: shoulder flex 135, abd 100, ER base of occiput, IR T12 (pain with IR)  AROM LUE (degrees)  LUE General AROM: shoulder flex 140, abd 115, ER base of occiput, IR T12 (pain with IR)  AROM Cervical Spine   Cervical spine general AROM: flex 25, Ext 10, SB L 19, R 14, Rot L 23, R 30 (pain with all mvmt )  AROM Lumbar Spine   Lumbar spine general AROM: flex 50%, Ext, B SB, B rotation 25% (pain with all movments)     Strength  Strength RLE  Comment: 4+/5 Hip IR, ER,ankle DF 4/5 Hip flex, hip abd knee flex, ext 3/5 hip ext  Strength LLE  Comment: 3+/5 grossly except hip flex 4-/5 Hip ext, abd 3/5  Strength RUE  Comment: 4+/5 shoulder except ER 4/5 5/5 elbow  Trunk Strength  Trunk Flexion: 1/5     Palpation: Significant tightness noted B UT, LS, periscapular, thoracic paraspinals, L piriformis/glute, cervical paraspinals, scalenes; tender to palpate B UT, L piriformis lumbar paraspinals     Sensation: not tested    LE Special Tests/Functional Screens: NT d/t pain and difficulty tolerating positioning     Hip Scour            L []+ / [] -   R []+ / [] -   Arley               L []+ / [] -   R []+ / [] -   Leena                    L []+ / [] -   R []+ / [] -     NO

## 2024-07-06 PROBLEM — Z12.11 SCREENING FOR MALIGNANT NEOPLASM OF COLON: Status: RESOLVED | Noted: 2024-06-06 | Resolved: 2024-07-06

## 2024-07-09 ENCOUNTER — HOSPITAL ENCOUNTER (OUTPATIENT)
Dept: PHYSICAL THERAPY | Age: 59
Setting detail: THERAPIES SERIES
Discharge: HOME | End: 2024-07-09
Payer: MEDICAID

## 2024-07-09 PROCEDURE — 97110 THERAPEUTIC EXERCISES: CPT

## 2024-07-09 PROCEDURE — 97140 MANUAL THERAPY 1/> REGIONS: CPT

## 2024-07-09 ASSESSMENT — PAIN DESCRIPTION - DESCRIPTORS: DESCRIPTORS: STABBING

## 2024-07-09 ASSESSMENT — PAIN DESCRIPTION - PAIN TYPE: TYPE: CHRONIC PAIN

## 2024-07-09 ASSESSMENT — PAIN DESCRIPTION - LOCATION: LOCATION: BACK;NECK

## 2024-07-09 ASSESSMENT — PAIN SCALES - GENERAL: PAINLEVEL_OUTOF10: 8

## 2024-07-09 ASSESSMENT — PAIN DESCRIPTION - ORIENTATION: ORIENTATION: MID;UPPER

## 2024-07-09 NOTE — PROGRESS NOTES
[place] [address] [facility] [place] [address]  Phone: [phone]      Date: 2024  Patient: Gustavo Coyne  : 1965   Confirmed: Yes  MRN: [id number]  Referring Provider: Kaycee Pal PA-C    Medical Diagnosis: Chronic pain of right knee [M25.561, G89.29]  Neck pain, musculoskeletal [M54.2]  Abnormality of gait and mobility [R26.9]  Osteoarthritis of spine with radiculopathy, lumbar region [M47.26]       Treatment Diagnosis: LBP, neck pain, B LE pain, B shoulder pain, decreased lumbar, cervical, and B shoulder AROM, decreased UE, LE, and core strength, impaired gait, impaired functional mobility,impaired balance, decreased posture, significant soft tissue restrictions, and decreased activity tolerance    Visit Information:  Insurance: Payor: [employer] [place] / Plan: [employer] [place] / Product Type: *No Product type* /   PT Visit Information  Onset Date:  (years)  PT Insurance Information: United Healthcare Community Plan  Total # of Visits Approved:  (48 units)  Total # of Visits to Date: 2  Plan of Care/Certification Expiration Date: 24  No Show: 0  Canceled Appointment: 0  Progress Note Counter:  (3/48 units by 24)    Subjective Information:  Subjective:  Aramis #[id number]. Pt reports pain \"is regular.\" Pt reports pain is worse in upper back/neck.  HEP Compliance:  not yet est    Pain Screening  Patient Currently in Pain: Yes  Pain Assessment: 0-10  Pain Level: 8  Pain Type: Chronic pain  Pain Location: Back, Neck  Pain Orientation: Mid, Upper  Pain Descriptors: Stabbing    Treatment:  Exercises:  Exercises  Exercise 1: nustep UE/LE seat 9 arms 9 L1 x5 mins  Exercise 5: sink ex: HR, march, hip abd, hip ext, knee flex, mini squat x10 ea  Exercise 9: seated scap retract 5''x10, shrugs 5''x10, posterior rolls 5''x10  Exercise 10: cervical AROM seated 5'' hold at end range x10 ea  Exercise 20: HEP: sink

## 2024-07-15 ENCOUNTER — OFFICE VISIT (OUTPATIENT)
Dept: ORTHOPEDIC SURGERY | Age: 59
End: 2024-07-15
Payer: MEDICAID

## 2024-07-15 VITALS
HEIGHT: 67 IN | OXYGEN SATURATION: 96 % | BODY MASS INDEX: 32.02 KG/M2 | TEMPERATURE: 97.5 F | HEART RATE: 72 BPM | WEIGHT: 204 LBS

## 2024-07-15 DIAGNOSIS — M25.361 INSTABILITY OF RIGHT KNEE JOINT: ICD-10-CM

## 2024-07-15 DIAGNOSIS — M23.91 LOCKING KNEE, RIGHT: Primary | ICD-10-CM

## 2024-07-15 PROCEDURE — G8427 DOCREV CUR MEDS BY ELIG CLIN: HCPCS | Performed by: PHYSICIAN ASSISTANT

## 2024-07-15 PROCEDURE — 1036F TOBACCO NON-USER: CPT | Performed by: PHYSICIAN ASSISTANT

## 2024-07-15 PROCEDURE — G8417 CALC BMI ABV UP PARAM F/U: HCPCS | Performed by: PHYSICIAN ASSISTANT

## 2024-07-15 PROCEDURE — 3017F COLORECTAL CA SCREEN DOC REV: CPT | Performed by: PHYSICIAN ASSISTANT

## 2024-07-15 PROCEDURE — 99203 OFFICE O/P NEW LOW 30 MIN: CPT | Performed by: PHYSICIAN ASSISTANT

## 2024-07-15 RX ORDER — CYCLOBENZAPRINE HCL 10 MG
TABLET ORAL
COMMUNITY
Start: 2024-06-10

## 2024-07-15 SDOH — HEALTH STABILITY: PHYSICAL HEALTH: ON AVERAGE, HOW MANY MINUTES DO YOU ENGAGE IN EXERCISE AT THIS LEVEL?: 30 MIN

## 2024-07-15 SDOH — HEALTH STABILITY: PHYSICAL HEALTH: ON AVERAGE, HOW MANY DAYS PER WEEK DO YOU ENGAGE IN MODERATE TO STRENUOUS EXERCISE (LIKE A BRISK WALK)?: 3 DAYS

## 2024-07-15 ASSESSMENT — ENCOUNTER SYMPTOMS: RESPIRATORY NEGATIVE: 1
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
## 2024-07-15 NOTE — PATIENT INSTRUCTIONS
Please call [phone] to set up an MRI appointment.  I would like to see you back several days after your MRI scan.  Tell them when you are making the appointment that I would like to see you back in the office 1 to 2 days after the MRI scan.

## 2024-07-24 ENCOUNTER — TELEPHONE (OUTPATIENT)
Dept: FAMILY MEDICINE CLINIC | Age: 59
End: 2024-07-24

## 2024-07-24 ENCOUNTER — HOSPITAL ENCOUNTER (OUTPATIENT)
Dept: PHYSICAL THERAPY | Age: 59
Setting detail: THERAPIES SERIES
Discharge: HOME | End: 2024-07-24
Payer: MEDICAID

## 2024-07-24 PROCEDURE — 97110 THERAPEUTIC EXERCISES: CPT

## 2024-07-24 NOTE — TELEPHONE ENCOUNTER
Patient called in stating [employer] is requesting a letter from PCP stating he is not able to work due to his condition.     Thank you.

## 2024-07-24 NOTE — PROGRESS NOTES
[facility]  [address] [facility] [place] [address]  Phone: [phone]      Date: 2024  Patient: Gustavo Coyne  : 1965   Confirmed: Yes  MRN: [id number]  Referring Provider: Kaycee Pal PA-C    Medical Diagnosis: Chronic pain of right knee [M25.561, G89.29]  Neck pain, musculoskeletal [M54.2]  Abnormality of gait and mobility [R26.9]  Osteoarthritis of spine with radiculopathy, lumbar region [M47.26]       Treatment Diagnosis: LBP, neck pain, B LE pain, B shoulder pain, decreased lumbar, cervical, and B shoulder AROM, decreased UE, LE, and core strength, impaired gait, impaired functional mobility,impaired balance, decreased posture, significant soft tissue restrictions, and decreased activity tolerance    Visit Information:  Insurance: Payor: [employer] PL / Plan: [place] / Product Type: *No Product type* /   PT Visit Information  Onset Date:  (years)  PT Insurance Information: United Healthcare Community Plan  Total # of Visits Approved:  (48 units)  Total # of Visits to Date: 3  Plan of Care/Certification Expiration Date: 24  No Show: 0  Canceled Appointment: 0  Progress Note Counter:  (6/48 units by 24)    Subjective Information:  Subjective:  gabe #[id number]. Pt reports pain is \"regular.\" Pt reports some days when he does HEP he feels great and some days he feels \"so so\". Pt reports seeing pain management doctor and they sent him to do some test but they still don't know.  HEP Compliance:  [x] Good [] Fair [] Poor [] Reports not doing due to:    Pain Screening  Patient Currently in Pain: Yes  Pain Level: 7  Pain Type: Chronic pain  Pain Location: Neck, Leg  Pain Orientation: Mid, Upper, Right  Pain Descriptors: Aching    Treatment:  Exercises:  Exercises  Exercise 1: nustep UE/LE seat 9 arms 9 L1 x5 mins  Exercise 3: pball flexion stretches 3 way blue ball  x10 5\"  Exercise 5: sink ex:

## 2024-07-29 ENCOUNTER — HOSPITAL ENCOUNTER (OUTPATIENT)
Dept: MRI IMAGING | Age: 59
Discharge: HOME | End: 2024-07-31
Payer: MEDICAID

## 2024-07-29 DIAGNOSIS — M23.91 LOCKING KNEE, RIGHT: ICD-10-CM

## 2024-07-29 PROCEDURE — 73721 MRI JNT OF LWR EXTRE W/O DYE: CPT

## 2024-07-30 ENCOUNTER — HOSPITAL ENCOUNTER (OUTPATIENT)
Dept: PHYSICAL THERAPY | Age: 59
Setting detail: THERAPIES SERIES
Discharge: HOME | End: 2024-07-30
Payer: MEDICAID

## 2024-07-30 PROCEDURE — 97110 THERAPEUTIC EXERCISES: CPT

## 2024-07-30 PROCEDURE — 97112 NEUROMUSCULAR REEDUCATION: CPT

## 2024-07-30 ASSESSMENT — PAIN DESCRIPTION - DESCRIPTORS: DESCRIPTORS: ACHING;SORE

## 2024-07-30 ASSESSMENT — PAIN SCALES - GENERAL: PAINLEVEL_OUTOF10: 7

## 2024-07-30 ASSESSMENT — PAIN DESCRIPTION - ORIENTATION: ORIENTATION: LOWER;LEFT

## 2024-07-30 ASSESSMENT — PAIN DESCRIPTION - LOCATION: LOCATION: BACK

## 2024-07-30 ASSESSMENT — PAIN DESCRIPTION - PAIN TYPE: TYPE: CHRONIC PAIN

## 2024-07-30 NOTE — PROGRESS NOTES
LTR 5''x10  Exercise 7: SLR x10  Exercise 8: bridge 5''x10  Exercise 9: S/L hip abd x10 B  Exercise 13: UT stretch 30''x3 B  Exercise 20: HEP: LTR, SLR, bridges, S/L hip abd       *Indicates exercise, modality, or manual techniques to be initiated when appropriate    Objective Measures:     LTG 1 Current Status:: 7/30/24 47/56; pt denies falls since beginning PT  LTG 2 Current Status:: 7/30/24 ongoing, pt reports fair-good compliance     Assessment:      Assessment: Cont to progress therex for increased strength, ROM, and decreased pain w/ functional mobility. Pt demo's a 9 point increase in samuel balance score since eval, meeting LTG 1. Also initiated various stretches and strengthening ex's for LE's w/ pt noting slight L hip and knee discomfort, though able to tolerate. Progressed Tristanian HEP w/ pt verbalizing good understanding.  Treatment Diagnosis: LBP, neck pain, B LE pain, B shoulder pain, decreased lumbar, cervical, and B shoulder AROM, decreased UE, LE, and core strength, impaired gait, impaired functional mobility,impaired balance, decreased posture, significant soft tissue restrictions, and decreased activity tolerance  Therapy Prognosis: Fair       Patient Education: [] NA   HEP, goals/progress    Post-Pain Assessment:       Pain Rating (0-10 pain scale):   4/10   Location and pain description same as pre-treatment unless indicated.   Action: [] NA   [x] Perform HEP  [] Meds as prescribed  [] Modalities as prescribed   [] Call Physician     GOALS   Patient Goal(s): Patient Goals : \"Dont feel pain\"    Long Term Goals Completed by 6 weeks Goal Status   LTG 1 The pt will demo improved Samuel score >/=45/56 in order to increase safety with functional mobility Met   LTG 2 The pt will be indep/compliant with HEP in order to self-manage symptoms upon D/C In progress   LTG 3 The pt will have an increase in UEFI/LEFS and decrease in ADONAY score >/=8 points in order to increase functional activity tolerance In

## 2024-08-01 ENCOUNTER — HOSPITAL ENCOUNTER (OUTPATIENT)
Dept: PHYSICAL THERAPY | Age: 59
Setting detail: THERAPIES SERIES
Discharge: HOME | End: 2024-08-01

## 2024-08-01 ENCOUNTER — OFFICE VISIT (OUTPATIENT)
Dept: ORTHOPEDIC SURGERY | Age: 59
End: 2024-08-01
Payer: MEDICAID

## 2024-08-01 VITALS
OXYGEN SATURATION: 95 % | BODY MASS INDEX: 32.78 KG/M2 | HEIGHT: 66 IN | TEMPERATURE: 97.8 F | HEART RATE: 82 BPM | WEIGHT: 204 LBS

## 2024-08-01 DIAGNOSIS — S83.231D COMPLEX TEAR OF MEDIAL MENISCUS OF RIGHT KNEE AS CURRENT INJURY, SUBSEQUENT ENCOUNTER: Primary | ICD-10-CM

## 2024-08-01 PROCEDURE — 1036F TOBACCO NON-USER: CPT | Performed by: PHYSICIAN ASSISTANT

## 2024-08-01 PROCEDURE — 3017F COLORECTAL CA SCREEN DOC REV: CPT | Performed by: PHYSICIAN ASSISTANT

## 2024-08-01 PROCEDURE — G8417 CALC BMI ABV UP PARAM F/U: HCPCS | Performed by: PHYSICIAN ASSISTANT

## 2024-08-01 PROCEDURE — 99214 OFFICE O/P EST MOD 30 MIN: CPT | Performed by: PHYSICIAN ASSISTANT

## 2024-08-01 PROCEDURE — G8427 DOCREV CUR MEDS BY ELIG CLIN: HCPCS | Performed by: PHYSICIAN ASSISTANT

## 2024-08-01 ASSESSMENT — ENCOUNTER SYMPTOMS: RESPIRATORY NEGATIVE: 1
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
## 2024-08-01 NOTE — PROGRESS NOTES
Therapy                            Cancellation/No-show Note      Date: 2024  Patient: Gustavo Coyne (58 y.o. male)  : 1965  MRN:  [id number]  Referring Physician: Kaycee Pal PA-C    Medical Diagnosis: Chronic pain of right knee [M25.561, G89.29]  Neck pain, musculoskeletal [M54.2]  Abnormality of gait and mobility [R26.9]  Osteoarthritis of spine with radiculopathy, lumbar region [M47.26]      Visit Information:  Visits to Date 4   No Show/Cancelled Appts: 0       For today's appointment patient:  [x]  Cancelled  []  Rescheduled appointment  []  No-show   []  Called pt to remind of next appointment     Reason given by patient:  [x]  Patient ill  []  Conflicting appointment  []  No transportation    []  Conflict with work  []  No reason given  []  Other:      [x] Pt has future appointments scheduled, no follow up needed  [] Pt requests to be on hold.    Reason:   If > 2 weeks please discuss with therapist.  [] Therapist to call pt for follow up     Comments:       Signature: Electronically signed by Sylvia Bean PT on 24 at 10:08 AM EDT

## 2024-08-06 ENCOUNTER — HOSPITAL ENCOUNTER (OUTPATIENT)
Dept: PHYSICAL THERAPY | Age: 59
Setting detail: THERAPIES SERIES
Discharge: HOME | End: 2024-08-06
Payer: MEDICAID

## 2024-08-06 PROCEDURE — 97110 THERAPEUTIC EXERCISES: CPT

## 2024-08-06 ASSESSMENT — PAIN DESCRIPTION - LOCATION: LOCATION: BACK;KNEE;HIP

## 2024-08-06 ASSESSMENT — PAIN DESCRIPTION - DESCRIPTORS: DESCRIPTORS: ACHING;SORE;STABBING

## 2024-08-06 ASSESSMENT — PAIN DESCRIPTION - PAIN TYPE: TYPE: CHRONIC PAIN

## 2024-08-06 ASSESSMENT — PAIN DESCRIPTION - ORIENTATION: ORIENTATION: LEFT;LOWER;RIGHT

## 2024-08-06 ASSESSMENT — PAIN SCALES - GENERAL: PAINLEVEL_OUTOF10: 8

## 2024-08-06 NOTE — PROGRESS NOTES
[facility]  [address] [facility] [address] [place] [address]  Phone: [phone]      Date: 2024  Patient: Gustavo Coyne  : 1965   Confirmed: Yes  MRN: [id number]  Referring Provider: Kaycee Pal PA-C    Medical Diagnosis: Chronic pain of right knee [M25.561, G89.29]  Neck pain, musculoskeletal [M54.2]  Abnormality of gait and mobility [R26.9]  Osteoarthritis of spine with radiculopathy, lumbar region [M47.26]       Treatment Diagnosis: LBP, neck pain, B LE pain, B shoulder pain, decreased lumbar, cervical, and B shoulder AROM, decreased UE, LE, and core strength, impaired gait, impaired functional mobility,impaired balance, decreased posture, significant soft tissue restrictions, and decreased activity tolerance    Visit Information:  Insurance: Payor: [employer] PL / Plan: [place] / Product Type: *No Product type* /   PT Visit Information  Onset Date:  (years)  PT Insurance Information: United Healthcare Community Plan  Total # of Visits Approved:  (48 units)  Total # of Visits to Date: 5  Plan of Care/Certification Expiration Date: 24  No Show: 0  Canceled Appointment: 1  Progress Note Counter:  (10/48 units by 24)    Subjective Information:  Subjective: Interpeter #: [id number] Grace; Last week (wednesday) I was doing the exercises and my affected leg went out to the side and I lost my balance and fell and hit my hip. I also have increased pain due to weather.  HEP Compliance:  [x] Good [] Fair [] Poor [] Reports not doing due to:    Pain Screening  Patient Currently in Pain: Yes  Pain Level: 8  Pain Type: Chronic pain  Pain Location: Back, Knee, Hip  Pain Orientation: Left, Lower, Right  Pain Descriptors: Aching, Sore, Stabbing    Treatment:  Exercises:  Exercises  Exercise 1: nustep UE/LE seat 9 arms 9 L2 x6 mins  Exercise 3: pball flexion stretches 3 way blue ball 5'' x10  Exercise 4: HEP review  Exercise 6: LTR 5''x15

## 2024-08-08 ENCOUNTER — HOSPITAL ENCOUNTER (OUTPATIENT)
Dept: PHYSICAL THERAPY | Age: 59
Setting detail: THERAPIES SERIES
Discharge: HOME | End: 2024-08-08
Payer: MEDICAID

## 2024-08-08 PROCEDURE — 97110 THERAPEUTIC EXERCISES: CPT

## 2024-08-08 ASSESSMENT — PAIN DESCRIPTION - LOCATION: LOCATION: BACK;HIP;KNEE

## 2024-08-08 ASSESSMENT — PAIN DESCRIPTION - DESCRIPTORS: DESCRIPTORS: ACHING;STABBING

## 2024-08-08 ASSESSMENT — PAIN DESCRIPTION - PAIN TYPE: TYPE: CHRONIC PAIN

## 2024-08-08 ASSESSMENT — PAIN SCALES - GENERAL: PAINLEVEL_OUTOF10: 8

## 2024-08-08 ASSESSMENT — PAIN DESCRIPTION - ORIENTATION: ORIENTATION: LEFT;LOWER;RIGHT

## 2024-08-08 NOTE — PROGRESS NOTES
[facility]  [address] [facility] [place] [address]  Phone: [phone]      Date: 2024  Patient: Gustavo Coyne  : 1965   Confirmed: Yes  MRN: [id number]  Referring Provider: Kaycee Pal PA-C    Medical Diagnosis: Chronic pain of right knee [M25.561, G89.29]  Neck pain, musculoskeletal [M54.2]  Abnormality of gait and mobility [R26.9]  Osteoarthritis of spine with radiculopathy, lumbar region [M47.26]       Treatment Diagnosis: LBP, neck pain, B LE pain, B shoulder pain, decreased lumbar, cervical, and B shoulder AROM, decreased UE, LE, and core strength, impaired gait, impaired functional mobility,impaired balance, decreased posture, significant soft tissue restrictions, and decreased activity tolerance    Visit Information:  Insurance: Payor: [employer] PL / Plan: [employer] / Product Type: *No Product type* /   PT Visit Information  Onset Date:  (years)  PT Insurance Information: United Healthcare Community Plan  Total # of Visits Approved:  (48 units)  Total # of Visits to Date: 6  Plan of Care/Certification Expiration Date: 24  No Show: 0  Canceled Appointment: 1  Progress Note Counter:  (18/48 units by 24)    Subjective Information:  Subjective: Ino from [place] intrepretting: Pt reports \"I woke up and felt like I fought [address] [place].\"  Notes pain/discomfort is in the lower back and in the R knee. Reports he has an appt with a surgeon for the R knee on Aug 13th.  HEP Compliance:  [x] Good [] Fair [] Poor [] Reports not doing due to:    Pain Screening  Patient Currently in Pain: Yes  Pain Assessment: 0-10  Pain Level: 8  Pain Type: Chronic pain  Pain Location: Back, Hip, Knee  Pain Orientation: Left, Lower, Right  Pain Descriptors: Aching, Stabbing (\"comes and goes\")    Treatment:  Exercises:  Exercises  Exercise 1: nustep UE/LE seat 9 arms 9 L2 x6 mins  Exercise 2: Stairs (multiple trials) x 12 steps total-

## 2024-08-13 ENCOUNTER — OFFICE VISIT (OUTPATIENT)
Dept: ORTHOPEDIC SURGERY | Age: 59
End: 2024-08-13
Payer: MEDICAID

## 2024-08-13 ENCOUNTER — APPOINTMENT (OUTPATIENT)
Dept: PHYSICAL THERAPY | Age: 59
End: 2024-08-13
Payer: MEDICAID

## 2024-08-13 VITALS
TEMPERATURE: 97.3 F | OXYGEN SATURATION: 99 % | BODY MASS INDEX: 32.78 KG/M2 | HEIGHT: 66 IN | WEIGHT: 204 LBS | HEART RATE: 89 BPM

## 2024-08-13 DIAGNOSIS — S83.241A ACUTE MEDIAL MENISCUS TEAR OF RIGHT KNEE, INITIAL ENCOUNTER: Primary | ICD-10-CM

## 2024-08-13 PROCEDURE — G8427 DOCREV CUR MEDS BY ELIG CLIN: HCPCS | Performed by: ORTHOPAEDIC SURGERY

## 2024-08-13 PROCEDURE — G8417 CALC BMI ABV UP PARAM F/U: HCPCS | Performed by: ORTHOPAEDIC SURGERY

## 2024-08-13 PROCEDURE — 3017F COLORECTAL CA SCREEN DOC REV: CPT | Performed by: ORTHOPAEDIC SURGERY

## 2024-08-13 PROCEDURE — 1036F TOBACCO NON-USER: CPT | Performed by: ORTHOPAEDIC SURGERY

## 2024-08-13 PROCEDURE — 99215 OFFICE O/P EST HI 40 MIN: CPT | Performed by: ORTHOPAEDIC SURGERY

## 2024-08-13 RX ORDER — DICLOFENAC SODIUM 75 MG/1
TABLET, DELAYED RELEASE ORAL
COMMUNITY
Start: 2024-07-17

## 2024-08-13 RX ORDER — CHLORHEXIDINE GLUCONATE 40 MG/ML
SOLUTION TOPICAL
Qty: 118 ML | Refills: 0 | Status: SHIPPED | OUTPATIENT
Start: 2024-08-13

## 2024-08-15 ENCOUNTER — HOSPITAL ENCOUNTER (OUTPATIENT)
Dept: PHYSICAL THERAPY | Age: 59
Setting detail: THERAPIES SERIES
Discharge: HOME | End: 2024-08-15
Payer: MEDICAID

## 2024-08-15 PROCEDURE — 97110 THERAPEUTIC EXERCISES: CPT

## 2024-08-15 ASSESSMENT — PAIN DESCRIPTION - PAIN TYPE: TYPE: CHRONIC PAIN

## 2024-08-15 ASSESSMENT — PAIN SCALES - GENERAL: PAINLEVEL_OUTOF10: 8

## 2024-08-15 ASSESSMENT — PAIN DESCRIPTION - DESCRIPTORS: DESCRIPTORS: ACHING;STABBING;THROBBING

## 2024-08-15 ASSESSMENT — PAIN DESCRIPTION - LOCATION: LOCATION: BACK;HIP;KNEE

## 2024-08-15 ASSESSMENT — PAIN DESCRIPTION - ORIENTATION: ORIENTATION: LEFT;LOWER;RIGHT

## 2024-08-15 NOTE — PROGRESS NOTES
[facility]  [address] [facility] [place] [address]  Phone: [phone]      Date: 8/15/2024  Patient: Gustavo Coyne  : 1965   Confirmed: Yes  MRN: [id number]  Referring Provider: Kaycee Pal PA-C    Medical Diagnosis: Chronic pain of right knee [M25.561, G89.29]  Neck pain, musculoskeletal [M54.2]  Abnormality of gait and mobility [R26.9]  Osteoarthritis of spine with radiculopathy, lumbar region [M47.26]       Treatment Diagnosis: LBP, neck pain, B LE pain, B shoulder pain, decreased lumbar, cervical, and B shoulder AROM, decreased UE, LE, and core strength, impaired gait, impaired functional mobility,impaired balance, decreased posture, significant soft tissue restrictions, and decreased activity tolerance    Visit Information:  Insurance: Payor: [employer] PL / Plan: [email or web address] [place] / Product Type: *No Product type* /   PT Visit Information  Onset Date:  (years)  PT Insurance Information: United Healthcare Community Plan  Total # of Visits Approved:  (48 units)  Total # of Visits to Date: 7  Plan of Care/Certification Expiration Date: 24  No Show: 0  Canceled Appointment: 1  Progress Note Counter:  (22/48 units by 24)    Subjective Information:  Subjective: Ino from [place] intrepretting: Pt a few minutes later d/t traffic. Pt reports he will possibly getting surgery on his R knee. Notes pain has been in his R shoulder than runs down toward the hand. States he'll be getting injection in the R shoulder.  HEP Compliance:  [x] Good [] Fair [] Poor [] Reports not doing due to:    Pain Screening  Patient Currently in Pain: Yes  Pain Assessment: 0-10  Pain Level: 8 (6/10- R knee)  Pain Type: Chronic pain  Pain Location: Back, Hip, Knee  Pain Orientation: Left, Lower, Right  Pain Descriptors: Aching, Stabbing, Throbbing (\"a current running down.\")    Treatment:  Exercises:  Exercises  Exercise 1: nustep UE/LE seat 9 arms 9

## 2024-08-20 ENCOUNTER — HOSPITAL ENCOUNTER (OUTPATIENT)
Dept: PHYSICAL THERAPY | Age: 59
Setting detail: THERAPIES SERIES
Discharge: HOME | End: 2024-08-20
Payer: MEDICAID

## 2024-08-20 PROCEDURE — 97112 NEUROMUSCULAR REEDUCATION: CPT

## 2024-08-20 PROCEDURE — 97110 THERAPEUTIC EXERCISES: CPT

## 2024-08-20 ASSESSMENT — PAIN DESCRIPTION - DESCRIPTORS: DESCRIPTORS: ACHING;STABBING;THROBBING

## 2024-08-20 ASSESSMENT — PAIN DESCRIPTION - ORIENTATION: ORIENTATION: LEFT;LOWER;RIGHT

## 2024-08-20 ASSESSMENT — PAIN DESCRIPTION - LOCATION: LOCATION: KNEE;SHOULDER

## 2024-08-20 ASSESSMENT — PAIN DESCRIPTION - PAIN TYPE: TYPE: CHRONIC PAIN

## 2024-08-20 ASSESSMENT — PAIN SCALES - GENERAL: PAINLEVEL_OUTOF10: 6

## 2024-08-20 NOTE — PROGRESS NOTES
[place] [address] [facility] [place] [address]  Phone: [phone]      Date: 2024  Patient: Gustavo Coyne  : 1965   Confirmed: Yes  MRN: [id number]  Referring Provider: Kaycee Pal PA-C    Medical Diagnosis: Chronic pain of right knee [M25.561, G89.29]  Neck pain, musculoskeletal [M54.2]  Abnormality of gait and mobility [R26.9]  Osteoarthritis of spine with radiculopathy, lumbar region [M47.26]       Treatment Diagnosis: LBP, neck pain, B LE pain, B shoulder pain, decreased lumbar, cervical, and B shoulder AROM, decreased UE, LE, and core strength, impaired gait, impaired functional mobility,impaired balance, decreased posture, significant soft tissue restrictions, and decreased activity tolerance    Visit Information:  Insurance: Payor: [employer] PL / Plan: [employer] [place] / Product Type: *No Product type* /   PT Visit Information  Onset Date:  (years)  PT Insurance Information: United Healthcare Community Plan  Total # of Visits Approved:  (48 units)  Total # of Visits to Date: 8  Plan of Care/Certification Expiration Date: 24  No Show: 0  Canceled Appointment: 1  Progress Note Counter:  (26/48 units by 24)    Subjective Information:  Subjective: Ipad Interpretter: [place] #[id number] Pt reports he has felt better since last session. Notes pain is more manageable and rates pain a 6/10.  HEP Compliance:  [x] Good [] Fair [] Poor [] Reports not doing due to:    Pain Screening  Patient Currently in Pain: Yes  Pain Assessment: 0-10  Pain Level: 6  Pain Type: Chronic pain  Pain Location: Knee, Shoulder  Pain Orientation: Left, Lower, Right  Pain Descriptors: Aching, Stabbing, Throbbing    Treatment:  Exercises:  Exercises  Exercise 2: 4 way hip x 10 ea love RTB  Exercise 3: BRISENO balance tasks  Exercise 7: Resisted side steps x 10 love RTBx2  Exercise 8: Pulleys x 3 minutes Flexion/ Abduction  Exercise 9: Tband rows/lats  x

## 2024-08-22 ENCOUNTER — HOSPITAL ENCOUNTER (OUTPATIENT)
Dept: PHYSICAL THERAPY | Age: 59
Setting detail: THERAPIES SERIES
Discharge: HOME | End: 2024-08-22
Payer: MEDICAID

## 2024-08-22 PROCEDURE — 97110 THERAPEUTIC EXERCISES: CPT

## 2024-08-22 ASSESSMENT — PAIN DESCRIPTION - LOCATION: LOCATION: SHOULDER;KNEE

## 2024-08-22 ASSESSMENT — PAIN DESCRIPTION - ORIENTATION: ORIENTATION: RIGHT

## 2024-08-22 ASSESSMENT — PAIN DESCRIPTION - PAIN TYPE: TYPE: CHRONIC PAIN

## 2024-08-22 ASSESSMENT — PAIN DESCRIPTION - DESCRIPTORS: DESCRIPTORS: ACHING

## 2024-08-22 ASSESSMENT — PAIN SCALES - GENERAL: PAINLEVEL_OUTOF10: 6

## 2024-08-22 NOTE — PROGRESS NOTES
[facility]  [address] [place] [address]  Phone: [phone]      Date: 2024  Patient: Gustavo Coyne  : 1965   Confirmed: Yes  MRN: [id number]  Referring Provider: Kaycee Pal PA-C    Medical Diagnosis: Chronic pain of right knee [M25.561, G89.29]  Neck pain, musculoskeletal [M54.2]  Abnormality of gait and mobility [R26.9]  Osteoarthritis of spine with radiculopathy, lumbar region [M47.26]       Treatment Diagnosis: LBP, neck pain, B LE pain, B shoulder pain, decreased lumbar, cervical, and B shoulder AROM, decreased UE, LE, and core strength, impaired gait, impaired functional mobility,impaired balance, decreased posture, significant soft tissue restrictions, and decreased activity tolerance    Visit Information:  Insurance: Payor: [employer] PL / Plan: [employer] [place] / Product Type: *No Product type* /   PT Visit Information  Onset Date:  (years)  PT Insurance Information: United Healthcare Community Plan  Total # of Visits Approved:  (48 units)  Total # of Visits to Date: 8  Plan of Care/Certification Expiration Date: 24  No Show: 0  Canceled Appointment: 1  Progress Note Counter:  (30/48 units by 24)    Subjective Information:  Subjective: Ipad Interpretter: Ino from [place]. Pt reports he is feeling fine after last session. Notes pain is a 6/10 in the R shoulder and 4/10 in the R knee.  HEP Compliance:  [x] Good [] Fair [] Poor [] Reports not doing due to:    Pain Screening  Patient Currently in Pain: Yes  Pain Assessment: 0-10  Pain Level: 6 (4/10 in R knee , 6/10 in R shoulder)  Pain Type: Chronic pain  Pain Location: Shoulder, Knee  Pain Orientation: Right  Pain Descriptors: Aching (\"poking\")    Treatment:  Exercises:  Exercises  Exercise 1: UBE x 5 minutes S: 9 L:1 (upper and lower)  Exercise 2: Wall Clocks x 10 ea RTB  Exercise 3: Unilateral Doorway pec stretches 5 x 15\" (unilateral fieldgoal,

## 2024-08-23 ENCOUNTER — PREP FOR PROCEDURE (OUTPATIENT)
Dept: ORTHOPEDIC SURGERY | Age: 59
End: 2024-08-23

## 2024-08-23 PROBLEM — S83.241A ACUTE MEDIAL MENISCUS TEAR OF RIGHT KNEE: Status: ACTIVE | Noted: 2024-08-23

## 2024-08-27 ENCOUNTER — HOSPITAL ENCOUNTER (OUTPATIENT)
Dept: PHYSICAL THERAPY | Age: 59
Setting detail: THERAPIES SERIES
Discharge: HOME | End: 2024-08-27
Payer: MEDICAID

## 2024-08-27 PROCEDURE — 97110 THERAPEUTIC EXERCISES: CPT

## 2024-08-27 ASSESSMENT — PAIN DESCRIPTION - PAIN TYPE: TYPE: CHRONIC PAIN

## 2024-08-27 ASSESSMENT — PAIN SCALES - GENERAL: PAINLEVEL_OUTOF10: 8

## 2024-08-27 ASSESSMENT — PAIN DESCRIPTION - DESCRIPTORS: DESCRIPTORS: ACHING;SORE

## 2024-08-27 ASSESSMENT — PAIN DESCRIPTION - LOCATION: LOCATION: SHOULDER;KNEE

## 2024-08-27 ASSESSMENT — PAIN DESCRIPTION - ORIENTATION: ORIENTATION: RIGHT;LEFT

## 2024-08-27 NOTE — PROGRESS NOTES
impaired gait, impaired functional mobility,impaired balance, decreased posture, significant soft tissue restrictions, and decreased activity tolerance  Therapy Prognosis: Fair       Patient Education: [x] NA       Post-Pain Assessment:       Pain Rating (0-10 pain scale):    2/10 shouldlers, 4/10 love knee/10   Location and pain description same as pre-treatment unless indicated.   Action: [] NA   [x] Perform HEP  [] Meds as prescribed  [] Modalities as prescribed   [] Call Physician     GOALS   Patient Goal(s): Patient Goals : \"Dont feel pain\"       Long Term Goals Completed by 6 weeks Goal Status   LTG 1 The pt will demo improved Harmon score >/=45/56 in order to increase safety with functional mobility Met   LTG 2 The pt will be indep/compliant with HEP in order to self-manage symptoms upon D/C In progress   LTG 3 The pt will have an increase in UEFI/LEFS and decrease in ADONAY score >/=8 points in order to increase functional activity tolerance In progress   LTG 4 The pt will demo improved cervical AROM >/=10* ea. in order to increase ease of ADL's In progress   LTG 5 The pt will demo improved B shoulder AROM flex/abd >/=140* and ER >/=T3 in order to increase ease of UE dressing In progress   LTG 6 The pt will demo improved lumbar AROM >/=25% ea plane in order to increase ease of LE dressing In progress, Partially met   LTG 7 The pt will demonstrate improved B UE/LE and cores strength >/=1/2 grade ea. in order to increase activity tolerance and assist in household duties such as light cleaning and laundry tasks In progress, Partially met               Plan:  Frequency/Duration:  Plan  Plan Frequency: 2  Plan weeks: 6  Current Treatment Recommendations: Strengthening, ROM, Balance training, Functional mobility training, Transfer training, Gait training, Stair training, Neuromuscular re-education, Manual, Home exercise program, Patient/Caregiver education & training, Equipment evaluation, education, & procurement,  Modalities, Positioning, Therapeutic activities, Group Therapy  Modalities: Heat/Cold, Ultrasound, E-stim - unattended  Additional Comments: *E-stim not covered by insurance*  Pt to continue current HEP.  See objective section for any therapeutic exercise changes, additions or modifications this date.    Therapy Time:      PT Individual Minutes  Time In: 1053  Time Out: 1157  Minutes: 64  Timed Code Treatment Minutes: 54 Minutes  Procedure Minutes: 10 CP  Timed Activity Minutes Units   Ther Ex 54 4     Electronically signed by Damian Wilson PTA on 8/27/24 at 12:15 PM EDT

## 2024-08-29 ENCOUNTER — HOSPITAL ENCOUNTER (OUTPATIENT)
Dept: PHYSICAL THERAPY | Age: 59
Setting detail: THERAPIES SERIES
Discharge: HOME | End: 2024-08-29
Payer: MEDICAID

## 2024-08-29 PROCEDURE — 97110 THERAPEUTIC EXERCISES: CPT

## 2024-08-29 ASSESSMENT — PAIN DESCRIPTION - ORIENTATION: ORIENTATION: RIGHT;LEFT

## 2024-08-29 ASSESSMENT — PAIN DESCRIPTION - PAIN TYPE: TYPE: CHRONIC PAIN

## 2024-08-29 ASSESSMENT — PAIN SCALES - GENERAL: PAINLEVEL_OUTOF10: 4

## 2024-08-29 ASSESSMENT — PAIN DESCRIPTION - DESCRIPTORS: DESCRIPTORS: SHARP;ACHING

## 2024-08-29 ASSESSMENT — PAIN DESCRIPTION - LOCATION: LOCATION: SHOULDER;KNEE

## 2024-08-29 NOTE — PROGRESS NOTES
[place] [address] [facility] [place] [address]  Phone: [phone]    [] Certification  [] Recertification []  Plan of Care  [] Progress Note [x] Discharge      Referring Provider: Kaycee Pal PA-C     From:  Sylvia Bean, PT  Patient: Gustavo Coyne (58 y.o. male) : 1965 Date: 2024  Medical Diagnosis: Chronic pain of right knee [M25.561, G89.29]  Neck pain, musculoskeletal [M54.2]  Abnormality of gait and mobility [R26.9]  Osteoarthritis of spine with radiculopathy, lumbar region [M47.26]       Treatment Diagnosis: LBP, neck pain, B LE pain, B shoulder pain, decreased lumbar, cervical, and B shoulder AROM, decreased UE, LE, and core strength, impaired gait, impaired functional mobility,impaired balance, decreased posture, significant soft tissue restrictions, and decreased activity tolerance    Plan of Care/Certification Expiration Date: 24   Progress Report Period from:  2024  to 2024    Visits to Date: 10 No Show: 0 Cancelled Appts: 1    OBJECTIVE:   Long Term Goals - Time Frame for Long Term Goals : 6 weeks  Goals Current/ Discharge status Status   Long Term Goal 1: The pt will demo improved Briseno score >/=45/56 in order to increase safety with functional mobility LTG 1 Current Status:: 24: BRISENO/56   Met   Long Term Goal 2: The pt will be indep/compliant with HEP in order to self-manage symptoms upon D/C LTG 2 Current Status:: 24: Pt reports the exercises at home have been ok at home. Performing 3x a week d/t his schedule.   Met   Long Term Goal 3: The pt will have an increase in UEFI/LEFS and decrease in ADONAY score >/=8 points in order to increase functional activity tolerance LTG 3 Current Status:: 24: UEFI 57/80 LEFI 41/80 / 24: ADONAY: 19 /50   Met   Long Term Goal 4: The pt will demo improved cervical AROM >/=10* ea. in order to increase ease of ADL's LTG 4 Current Status:: 24: cervical Flex: 55 Ext: 30, R SB: 36 L

## 2024-09-10 ENCOUNTER — OFFICE VISIT (OUTPATIENT)
Dept: ORTHOPEDIC SURGERY | Age: 59
End: 2024-09-10
Payer: MEDICAID

## 2024-09-10 VITALS
SYSTOLIC BLOOD PRESSURE: 124 MMHG | TEMPERATURE: 97.6 F | WEIGHT: 206 LBS | HEIGHT: 67 IN | HEART RATE: 77 BPM | DIASTOLIC BLOOD PRESSURE: 82 MMHG | OXYGEN SATURATION: 97 % | BODY MASS INDEX: 32.33 KG/M2

## 2024-09-10 DIAGNOSIS — Z01.818 PRE-OP EXAM: ICD-10-CM

## 2024-09-10 DIAGNOSIS — Z01.818 PRE-OP EXAM: Primary | ICD-10-CM

## 2024-09-10 LAB
ANION GAP SERPL CALCULATED.3IONS-SCNC: 10 MEQ/L (ref 9–15)
BASOPHILS # BLD: 0.1 K/UL (ref 0–0.2)
BASOPHILS NFR BLD: 0.8 %
BUN SERPL-MCNC: 15 MG/DL (ref 6–20)
CALCIUM SERPL-MCNC: 9.4 MG/DL (ref 8.5–9.9)
CHLORIDE SERPL-SCNC: 103 MEQ/L (ref 95–107)
CO2 SERPL-SCNC: 27 MEQ/L (ref 20–31)
CREAT SERPL-MCNC: 1.06 MG/DL (ref 0.7–1.2)
EOSINOPHIL # BLD: 0.3 K/UL (ref 0–0.7)
EOSINOPHIL NFR BLD: 3.4 %
ERYTHROCYTE [DISTWIDTH] IN BLOOD BY AUTOMATED COUNT: 12.1 % (ref 11.5–14.5)
GLUCOSE SERPL-MCNC: 112 MG/DL (ref 70–99)
HCT VFR BLD AUTO: 49.1 % (ref 42–52)
HGB BLD-MCNC: 16.7 G/DL (ref 14–18)
LYMPHOCYTES # BLD: 2.1 K/UL (ref 1–4.8)
LYMPHOCYTES NFR BLD: 27.2 %
MCH RBC QN AUTO: 32.1 PG (ref 27–31.3)
MCHC RBC AUTO-ENTMCNC: 34 % (ref 33–37)
MCV RBC AUTO: 94.2 FL (ref 79–92.2)
MONOCYTES # BLD: 0.7 K/UL (ref 0.2–0.8)
MONOCYTES NFR BLD: 8.9 %
NEUTROPHILS # BLD: 4.5 K/UL (ref 1.4–6.5)
NEUTS SEG NFR BLD: 59.3 %
PLATELET # BLD AUTO: 272 K/UL (ref 130–400)
POTASSIUM SERPL-SCNC: 4.1 MEQ/L (ref 3.4–4.9)
RBC # BLD AUTO: 5.21 M/UL (ref 4.7–6.1)
SODIUM SERPL-SCNC: 140 MEQ/L (ref 135–144)
WBC # BLD AUTO: 7.6 K/UL (ref 4.8–10.8)

## 2024-09-10 PROCEDURE — 93000 ELECTROCARDIOGRAM COMPLETE: CPT | Performed by: PHYSICIAN ASSISTANT

## 2024-09-10 PROCEDURE — PREOPEXAM PRE-OP EXAM: Performed by: PHYSICIAN ASSISTANT

## 2024-09-10 RX ORDER — SODIUM CHLORIDE 0.9 % (FLUSH) 0.9 %
5-40 SYRINGE (ML) INJECTION PRN
OUTPATIENT
Start: 2024-09-10

## 2024-09-10 RX ORDER — SODIUM CHLORIDE 9 MG/ML
INJECTION, SOLUTION INTRAVENOUS PRN
OUTPATIENT
Start: 2024-09-10

## 2024-09-10 RX ORDER — SODIUM CHLORIDE 0.9 % (FLUSH) 0.9 %
5-40 SYRINGE (ML) INJECTION EVERY 12 HOURS SCHEDULED
OUTPATIENT
Start: 2024-09-10

## 2024-09-10 RX ORDER — SODIUM CHLORIDE, SODIUM LACTATE, POTASSIUM CHLORIDE, CALCIUM CHLORIDE 600; 310; 30; 20 MG/100ML; MG/100ML; MG/100ML; MG/100ML
INJECTION, SOLUTION INTRAVENOUS CONTINUOUS
OUTPATIENT
Start: 2024-09-10

## 2024-09-11 LAB
ESTIMATED AVERAGE GLUCOSE: 123 MG/DL
HBA1C MFR BLD: 5.9 % (ref 4–6)

## 2024-09-18 ENCOUNTER — HOSPITAL ENCOUNTER (OUTPATIENT)
Age: 59
Setting detail: OUTPATIENT SURGERY
Discharge: HOME | End: 2024-09-18
Attending: ORTHOPAEDIC SURGERY | Admitting: ORTHOPAEDIC SURGERY
Payer: MEDICAID

## 2024-09-18 ENCOUNTER — ANESTHESIA (OUTPATIENT)
Dept: OPERATING ROOM | Age: 59
End: 2024-09-18
Payer: MEDICAID

## 2024-09-18 ENCOUNTER — ANESTHESIA EVENT (OUTPATIENT)
Dept: OPERATING ROOM | Age: 59
End: 2024-09-18
Payer: MEDICAID

## 2024-09-18 VITALS
SYSTOLIC BLOOD PRESSURE: 126 MMHG | RESPIRATION RATE: 12 BRPM | TEMPERATURE: 97.8 F | HEART RATE: 85 BPM | DIASTOLIC BLOOD PRESSURE: 78 MMHG | OXYGEN SATURATION: 97 %

## 2024-09-18 DIAGNOSIS — S83.241A ACUTE MEDIAL MENISCUS TEAR OF RIGHT KNEE, INITIAL ENCOUNTER: Primary | ICD-10-CM

## 2024-09-18 PROCEDURE — 2500000003 HC RX 250 WO HCPCS: Performed by: NURSE ANESTHETIST, CERTIFIED REGISTERED

## 2024-09-18 PROCEDURE — 2580000003 HC RX 258: Performed by: ORTHOPAEDIC SURGERY

## 2024-09-18 PROCEDURE — 6360000002 HC RX W HCPCS: Performed by: PHYSICIAN ASSISTANT

## 2024-09-18 PROCEDURE — 7100000011 HC PHASE II RECOVERY - ADDTL 15 MIN: Performed by: ORTHOPAEDIC SURGERY

## 2024-09-18 PROCEDURE — 7100000010 HC PHASE II RECOVERY - FIRST 15 MIN: Performed by: ORTHOPAEDIC SURGERY

## 2024-09-18 PROCEDURE — 2500000003 HC RX 250 WO HCPCS: Performed by: ORTHOPAEDIC SURGERY

## 2024-09-18 PROCEDURE — 6360000002 HC RX W HCPCS: Performed by: NURSE ANESTHETIST, CERTIFIED REGISTERED

## 2024-09-18 PROCEDURE — 29880 ARTHRS KNE SRG MNISECTMY M&L: CPT | Performed by: ORTHOPAEDIC SURGERY

## 2024-09-18 PROCEDURE — 7100000000 HC PACU RECOVERY - FIRST 15 MIN: Performed by: ORTHOPAEDIC SURGERY

## 2024-09-18 PROCEDURE — 3700000001 HC ADD 15 MINUTES (ANESTHESIA): Performed by: ORTHOPAEDIC SURGERY

## 2024-09-18 PROCEDURE — 7100000001 HC PACU RECOVERY - ADDTL 15 MIN: Performed by: ORTHOPAEDIC SURGERY

## 2024-09-18 PROCEDURE — 3600000004 HC SURGERY LEVEL 4 BASE: Performed by: ORTHOPAEDIC SURGERY

## 2024-09-18 PROCEDURE — 6360000002 HC RX W HCPCS: Performed by: STUDENT IN AN ORGANIZED HEALTH CARE EDUCATION/TRAINING PROGRAM

## 2024-09-18 PROCEDURE — 3700000000 HC ANESTHESIA ATTENDED CARE: Performed by: ORTHOPAEDIC SURGERY

## 2024-09-18 PROCEDURE — 2720000010 HC SURG SUPPLY STERILE: Performed by: ORTHOPAEDIC SURGERY

## 2024-09-18 PROCEDURE — 2580000003 HC RX 258: Performed by: PHYSICIAN ASSISTANT

## 2024-09-18 PROCEDURE — 2709999900 HC NON-CHARGEABLE SUPPLY: Performed by: ORTHOPAEDIC SURGERY

## 2024-09-18 PROCEDURE — 3600000014 HC SURGERY LEVEL 4 ADDTL 15MIN: Performed by: ORTHOPAEDIC SURGERY

## 2024-09-18 RX ORDER — MIDAZOLAM HYDROCHLORIDE 1 MG/ML
INJECTION INTRAMUSCULAR; INTRAVENOUS
Status: DISCONTINUED | OUTPATIENT
Start: 2024-09-18 | End: 2024-09-18 | Stop reason: SDUPTHER

## 2024-09-18 RX ORDER — PROCHLORPERAZINE EDISYLATE 5 MG/ML
5 INJECTION INTRAMUSCULAR; INTRAVENOUS
Status: DISCONTINUED | OUTPATIENT
Start: 2024-09-18 | End: 2024-09-18 | Stop reason: HOSPADM

## 2024-09-18 RX ORDER — ONDANSETRON 2 MG/ML
INJECTION INTRAMUSCULAR; INTRAVENOUS
Status: DISCONTINUED | OUTPATIENT
Start: 2024-09-18 | End: 2024-09-18 | Stop reason: SDUPTHER

## 2024-09-18 RX ORDER — SODIUM CHLORIDE 0.9 % (FLUSH) 0.9 %
5-40 SYRINGE (ML) INJECTION EVERY 12 HOURS SCHEDULED
Status: DISCONTINUED | OUTPATIENT
Start: 2024-09-18 | End: 2024-09-18 | Stop reason: HOSPADM

## 2024-09-18 RX ORDER — PROPOFOL 10 MG/ML
INJECTION, EMULSION INTRAVENOUS
Status: DISCONTINUED | OUTPATIENT
Start: 2024-09-18 | End: 2024-09-18 | Stop reason: SDUPTHER

## 2024-09-18 RX ORDER — FENTANYL CITRATE 0.05 MG/ML
50 INJECTION, SOLUTION INTRAMUSCULAR; INTRAVENOUS EVERY 5 MIN PRN
Status: DISCONTINUED | OUTPATIENT
Start: 2024-09-18 | End: 2024-09-18 | Stop reason: HOSPADM

## 2024-09-18 RX ORDER — NALOXONE HYDROCHLORIDE 0.4 MG/ML
INJECTION, SOLUTION INTRAMUSCULAR; INTRAVENOUS; SUBCUTANEOUS PRN
Status: DISCONTINUED | OUTPATIENT
Start: 2024-09-18 | End: 2024-09-18 | Stop reason: HOSPADM

## 2024-09-18 RX ORDER — OXYCODONE HYDROCHLORIDE 5 MG/1
10 TABLET ORAL PRN
Status: DISCONTINUED | OUTPATIENT
Start: 2024-09-18 | End: 2024-09-18 | Stop reason: HOSPADM

## 2024-09-18 RX ORDER — FENTANYL CITRATE 0.05 MG/ML
25 INJECTION, SOLUTION INTRAMUSCULAR; INTRAVENOUS EVERY 5 MIN PRN
Status: DISCONTINUED | OUTPATIENT
Start: 2024-09-18 | End: 2024-09-18 | Stop reason: HOSPADM

## 2024-09-18 RX ORDER — MEPERIDINE HYDROCHLORIDE 25 MG/ML
12.5 INJECTION INTRAMUSCULAR; INTRAVENOUS; SUBCUTANEOUS EVERY 5 MIN PRN
Status: DISCONTINUED | OUTPATIENT
Start: 2024-09-18 | End: 2024-09-18 | Stop reason: HOSPADM

## 2024-09-18 RX ORDER — ONDANSETRON 2 MG/ML
4 INJECTION INTRAMUSCULAR; INTRAVENOUS
Status: DISCONTINUED | OUTPATIENT
Start: 2024-09-18 | End: 2024-09-18 | Stop reason: HOSPADM

## 2024-09-18 RX ORDER — KETOROLAC TROMETHAMINE 30 MG/ML
INJECTION, SOLUTION INTRAMUSCULAR; INTRAVENOUS
Status: DISCONTINUED | OUTPATIENT
Start: 2024-09-18 | End: 2024-09-18 | Stop reason: SDUPTHER

## 2024-09-18 RX ORDER — DIPHENHYDRAMINE HYDROCHLORIDE 50 MG/ML
12.5 INJECTION INTRAMUSCULAR; INTRAVENOUS
Status: DISCONTINUED | OUTPATIENT
Start: 2024-09-18 | End: 2024-09-18 | Stop reason: HOSPADM

## 2024-09-18 RX ORDER — BUPIVACAINE HYDROCHLORIDE AND EPINEPHRINE 5; 5 MG/ML; UG/ML
INJECTION, SOLUTION EPIDURAL; INTRACAUDAL; PERINEURAL PRN
Status: DISCONTINUED | OUTPATIENT
Start: 2024-09-18 | End: 2024-09-18 | Stop reason: HOSPADM

## 2024-09-18 RX ORDER — LIDOCAINE HYDROCHLORIDE 10 MG/ML
INJECTION, SOLUTION EPIDURAL; INFILTRATION; INTRACAUDAL; PERINEURAL
Status: DISCONTINUED | OUTPATIENT
Start: 2024-09-18 | End: 2024-09-18 | Stop reason: SDUPTHER

## 2024-09-18 RX ORDER — OXYCODONE HYDROCHLORIDE 5 MG/1
5 TABLET ORAL PRN
Status: DISCONTINUED | OUTPATIENT
Start: 2024-09-18 | End: 2024-09-18 | Stop reason: HOSPADM

## 2024-09-18 RX ORDER — SODIUM CHLORIDE 9 MG/ML
INJECTION, SOLUTION INTRAVENOUS PRN
Status: DISCONTINUED | OUTPATIENT
Start: 2024-09-18 | End: 2024-09-18 | Stop reason: HOSPADM

## 2024-09-18 RX ORDER — OXYCODONE AND ACETAMINOPHEN 5; 325 MG/1; MG/1
1 TABLET ORAL EVERY 4 HOURS PRN
Qty: 28 TABLET | Refills: 0 | Status: SHIPPED | OUTPATIENT
Start: 2024-09-18 | End: 2024-09-25

## 2024-09-18 RX ORDER — SODIUM CHLORIDE 0.9 % (FLUSH) 0.9 %
5-40 SYRINGE (ML) INJECTION PRN
Status: DISCONTINUED | OUTPATIENT
Start: 2024-09-18 | End: 2024-09-18 | Stop reason: HOSPADM

## 2024-09-18 RX ORDER — MAGNESIUM HYDROXIDE 1200 MG/15ML
LIQUID ORAL CONTINUOUS PRN
Status: DISCONTINUED | OUTPATIENT
Start: 2024-09-18 | End: 2024-09-18 | Stop reason: HOSPADM

## 2024-09-18 RX ORDER — HYDRALAZINE HYDROCHLORIDE 20 MG/ML
10 INJECTION INTRAMUSCULAR; INTRAVENOUS
Status: DISCONTINUED | OUTPATIENT
Start: 2024-09-18 | End: 2024-09-18 | Stop reason: HOSPADM

## 2024-09-18 RX ORDER — LABETALOL HYDROCHLORIDE 5 MG/ML
10 INJECTION, SOLUTION INTRAVENOUS
Status: DISCONTINUED | OUTPATIENT
Start: 2024-09-18 | End: 2024-09-18 | Stop reason: HOSPADM

## 2024-09-18 RX ORDER — SODIUM CHLORIDE, SODIUM LACTATE, POTASSIUM CHLORIDE, CALCIUM CHLORIDE 600; 310; 30; 20 MG/100ML; MG/100ML; MG/100ML; MG/100ML
INJECTION, SOLUTION INTRAVENOUS CONTINUOUS
Status: DISCONTINUED | OUTPATIENT
Start: 2024-09-18 | End: 2024-09-18 | Stop reason: HOSPADM

## 2024-09-18 RX ORDER — FENTANYL CITRATE 50 UG/ML
INJECTION, SOLUTION INTRAMUSCULAR; INTRAVENOUS
Status: DISCONTINUED | OUTPATIENT
Start: 2024-09-18 | End: 2024-09-18 | Stop reason: SDUPTHER

## 2024-09-18 RX ORDER — DEXAMETHASONE SODIUM PHOSPHATE 10 MG/ML
INJECTION INTRAMUSCULAR; INTRAVENOUS
Status: DISCONTINUED | OUTPATIENT
Start: 2024-09-18 | End: 2024-09-18 | Stop reason: SDUPTHER

## 2024-09-18 RX ADMIN — KETOROLAC TROMETHAMINE 30 MG: 30 INJECTION, SOLUTION INTRAMUSCULAR at 10:18

## 2024-09-18 RX ADMIN — MIDAZOLAM HYDROCHLORIDE 2 MG: 1 INJECTION, SOLUTION INTRAMUSCULAR; INTRAVENOUS at 09:18

## 2024-09-18 RX ADMIN — PROPOFOL 200 MG: 10 INJECTION, EMULSION INTRAVENOUS at 09:24

## 2024-09-18 RX ADMIN — FENTANYL CITRATE 25 MCG: 0.05 INJECTION, SOLUTION INTRAMUSCULAR; INTRAVENOUS at 10:50

## 2024-09-18 RX ADMIN — FENTANYL CITRATE 25 MCG: 0.05 INJECTION, SOLUTION INTRAMUSCULAR; INTRAVENOUS at 10:55

## 2024-09-18 RX ADMIN — FENTANYL CITRATE 100 MCG: 50 INJECTION, SOLUTION INTRAMUSCULAR; INTRAVENOUS at 10:05

## 2024-09-18 RX ADMIN — FENTANYL CITRATE 100 MCG: 50 INJECTION, SOLUTION INTRAMUSCULAR; INTRAVENOUS at 09:24

## 2024-09-18 RX ADMIN — LIDOCAINE HYDROCHLORIDE 20 MG: 10 INJECTION, SOLUTION EPIDURAL; INFILTRATION; INTRACAUDAL; PERINEURAL at 09:24

## 2024-09-18 RX ADMIN — DEXAMETHASONE SODIUM PHOSPHATE 10 MG: 10 INJECTION INTRAMUSCULAR; INTRAVENOUS at 09:29

## 2024-09-18 RX ADMIN — SODIUM CHLORIDE, POTASSIUM CHLORIDE, SODIUM LACTATE AND CALCIUM CHLORIDE: 600; 310; 30; 20 INJECTION, SOLUTION INTRAVENOUS at 09:20

## 2024-09-18 RX ADMIN — ONDANSETRON 4 MG: 2 INJECTION INTRAMUSCULAR; INTRAVENOUS at 09:29

## 2024-09-18 RX ADMIN — CEFAZOLIN 2000 MG: 2 INJECTION, POWDER, FOR SOLUTION INTRAMUSCULAR; INTRAVENOUS at 09:30

## 2024-09-18 ASSESSMENT — PAIN SCALES - GENERAL
PAINLEVEL_OUTOF10: 6
PAINLEVEL_OUTOF10: 0
PAINLEVEL_OUTOF10: 0
PAINLEVEL_OUTOF10: 3

## 2024-09-18 ASSESSMENT — PAIN DESCRIPTION - LOCATION: LOCATION: KNEE

## 2024-09-18 ASSESSMENT — PAIN DESCRIPTION - ORIENTATION: ORIENTATION: RIGHT

## 2024-09-20 ENCOUNTER — TELEPHONE (OUTPATIENT)
Dept: ORTHOPEDIC SURGERY | Age: 59
End: 2024-09-20

## 2024-09-20 DIAGNOSIS — S83.241A ACUTE MEDIAL MENISCUS TEAR OF RIGHT KNEE, INITIAL ENCOUNTER: Primary | ICD-10-CM

## 2024-09-24 ENCOUNTER — TELEPHONE (OUTPATIENT)
Dept: ORTHOPEDIC SURGERY | Age: 59
End: 2024-09-24

## 2024-09-24 RX ORDER — HYDROCODONE BITARTRATE AND ACETAMINOPHEN 5; 325 MG/1; MG/1
1 TABLET ORAL EVERY 4 HOURS PRN
Qty: 30 TABLET | Refills: 0 | Status: SHIPPED | OUTPATIENT
Start: 2024-09-24 | End: 2024-10-01

## 2024-10-01 ENCOUNTER — OFFICE VISIT (OUTPATIENT)
Dept: ORTHOPEDIC SURGERY | Age: 59
End: 2024-10-01

## 2024-10-01 DIAGNOSIS — S83.241A ACUTE MEDIAL MENISCUS TEAR OF RIGHT KNEE, INITIAL ENCOUNTER: Primary | ICD-10-CM

## 2024-10-01 PROCEDURE — 99024 POSTOP FOLLOW-UP VISIT: CPT | Performed by: ORTHOPAEDIC SURGERY

## 2024-10-01 NOTE — PROGRESS NOTES
Subjective:      Patient ID: Gustavo Coyne is a 58 y.o. male who presents today for:  Chief Complaint   Patient presents with    Follow-up     Patient presents to clinic for first post-op of right knee arthroscopy. Patient states some pain today       Subjective/Objective/Assessment/Plan:     SUBJECTIVE -the patient had right knee arthroscopy on 9/18/2024.    OBJECTIVE -incision is well-approximated.  No signs of infection.  Range of motion to full extension 210 degrees of flexion.    MRI KNEE RIGHT WO CONTRAST  Narrative: EXAMINATION:  MRI OF THE RIGHT KNEE WITHOUT CONTRAST, 7/29/2024 8:26 am    TECHNIQUE:  Multiplanar multisequence MRI of the right knee was performed without the  administration of intravenous contrast.    COMPARISON:  None.    HISTORY:  ORDERING SYSTEM PROVIDED HISTORY: Locking knee, right  TECHNOLOGIST PROVIDED HISTORY:  Reason for exam:->knee pain  What reading provider will be dictating this exam?->CRC    FINDINGS:  MENISCI: There is a complex tear of the medial meniscus.  Somewhat globular  intrasubstance signal extends from the posterior root, with horizontal  component in the inferior leaflet of the body segment.    CRUCIATE LIGAMENTS: Intact anterior cruciate and posterior cruciate ligaments.    EXTENSOR MECHANISM: Intact quadriceps and patellar tendons. Intact patellar  retinacula.    LATERAL COLLATERAL LIGAMENT COMPLEX: Intact IT band, lateral collateral  ligament proper, biceps femoris tendon and popliteus tendon.    MEDIAL COLLATERAL LIGAMENT COMPLEX: The superficial and deep components of  the medial collateral ligament are intact.    KNEE JOINT: No evidence of significant chondromalacia. Physiologic amount of  joint fluid.    BONE MARROW: No evidence of fracture. Normal marrow signal.  Impression: Somewhat complex, predominantly horizontal tear of the medial meniscus.      ASSESSMENT -    Diagnosis Orders   1. Acute medial meniscus tear of right knee, initial encounter

## 2024-10-17 ENCOUNTER — TRANSCRIBE ORDERS (OUTPATIENT)
Dept: ADMINISTRATIVE | Age: 59
End: 2024-10-17

## 2024-10-17 DIAGNOSIS — M54.50 LOW BACK PAIN, UNSPECIFIED BACK PAIN LATERALITY, UNSPECIFIED CHRONICITY, UNSPECIFIED WHETHER SCIATICA PRESENT: Primary | ICD-10-CM

## 2024-10-17 DIAGNOSIS — R20.2 PARESTHESIA: ICD-10-CM

## 2024-10-29 ENCOUNTER — OFFICE VISIT (OUTPATIENT)
Dept: ORTHOPEDIC SURGERY | Age: 59
End: 2024-10-29

## 2024-10-29 VITALS — WEIGHT: 206 LBS | BODY MASS INDEX: 32.26 KG/M2

## 2024-10-29 DIAGNOSIS — S83.241A ACUTE MEDIAL MENISCUS TEAR OF RIGHT KNEE, INITIAL ENCOUNTER: Primary | ICD-10-CM

## 2024-10-29 RX ORDER — TRIAMCINOLONE ACETONIDE 40 MG/ML
80 INJECTION, SUSPENSION INTRA-ARTICULAR; INTRAMUSCULAR ONCE
Status: COMPLETED | OUTPATIENT
Start: 2024-10-29 | End: 2024-10-29

## 2024-10-29 RX ORDER — LIDOCAINE HYDROCHLORIDE 10 MG/ML
2 INJECTION, SOLUTION INFILTRATION; PERINEURAL ONCE
Status: COMPLETED | OUTPATIENT
Start: 2024-10-29 | End: 2024-10-29

## 2024-10-29 RX ADMIN — LIDOCAINE HYDROCHLORIDE 2 ML: 10 INJECTION, SOLUTION INFILTRATION; PERINEURAL at 11:17

## 2024-10-29 RX ADMIN — TRIAMCINOLONE ACETONIDE 80 MG: 40 INJECTION, SUSPENSION INTRA-ARTICULAR; INTRAMUSCULAR at 11:17

## 2024-10-29 NOTE — PROGRESS NOTES
right knee, initial encounter  Ambulatory referral to Physical Therapy          PLAN -he is better than he was before surgery.  He would like an injection into his right knee today.  We also placed him in physical therapy.  I would like to see him back in 6 weeks.    The patient has been diagnosed with right knee osteoarthritis and meniscus tear. I prepped the injection area with alcohol. Under aseptic technique, I then injected 80 mg of Kenalog and 2 cc of 1% lidocaine into the right knee joint. The patient tolerated the procedure well and there were no complications.  I would like to see the patient back in 2 weeks to assess her response to the injection.      --------------------------------------------------------------------------------------------------------------  Past Medical History:   Diagnosis Date    Degenerative joint disease (DJD) of lumbar spine 2010    History of 2019 novel coronavirus disease (COVID-19) 2023    Prediabetes 11/2023     --------------------------------------------------------------------------------------------------------------  Past Surgical History:   Procedure Laterality Date    COLONOSCOPY N/A 6/6/2024    COLORECTAL CANCER SCREENING, NOT HIGH RISK performed by Yesenia To MD at [facility]    KNEE ARTHROSCOPY Right 9/18/2024    Right knee arthroscopy with partial medial and lateral meniscectomy performed by Ino De La Fuente DO at [facility] OR     --------------------------------------------------------------------------------------------------------------    Tobacco Use      Smoking status: Never        Passive exposure: Never      Smokeless tobacco: Never     reports no history of drug use.  --------------------------------------------------------------------------------------------------------------  No Known Allergies  --------------------------------------------------------------------------------------------------------------    Current Outpatient Medications:

## 2024-11-05 ENCOUNTER — HOSPITAL ENCOUNTER (OUTPATIENT)
Dept: MRI IMAGING | Age: 59
Discharge: HOME | End: 2024-11-07
Payer: MEDICAID

## 2024-11-05 DIAGNOSIS — R20.2 PARESTHESIA: ICD-10-CM

## 2024-11-05 DIAGNOSIS — M54.50 LOW BACK PAIN, UNSPECIFIED BACK PAIN LATERALITY, UNSPECIFIED CHRONICITY, UNSPECIFIED WHETHER SCIATICA PRESENT: ICD-10-CM

## 2024-11-05 PROCEDURE — 72148 MRI LUMBAR SPINE W/O DYE: CPT

## 2024-11-06 ENCOUNTER — OFFICE VISIT (OUTPATIENT)
Dept: FAMILY MEDICINE CLINIC | Age: 59
End: 2024-11-06

## 2024-11-06 VITALS
BODY MASS INDEX: 31.55 KG/M2 | OXYGEN SATURATION: 95 % | HEIGHT: 67 IN | SYSTOLIC BLOOD PRESSURE: 110 MMHG | WEIGHT: 201 LBS | DIASTOLIC BLOOD PRESSURE: 68 MMHG | TEMPERATURE: 97.6 F | HEART RATE: 77 BPM

## 2024-11-06 DIAGNOSIS — M54.2 NECK PAIN, MUSCULOSKELETAL: ICD-10-CM

## 2024-11-06 DIAGNOSIS — R20.2 PARESTHESIA OF LEFT ARM: ICD-10-CM

## 2024-11-06 DIAGNOSIS — Z23 FLU VACCINE NEED: ICD-10-CM

## 2024-11-06 DIAGNOSIS — F45.8 BRUXISM: ICD-10-CM

## 2024-11-06 DIAGNOSIS — M47.26 OSTEOARTHRITIS OF SPINE WITH RADICULOPATHY, LUMBAR REGION: ICD-10-CM

## 2024-11-06 DIAGNOSIS — F41.8 ANXIETY WITH DEPRESSION: Primary | ICD-10-CM

## 2024-11-06 RX ORDER — GABAPENTIN 800 MG/1
800 TABLET ORAL NIGHTLY
Qty: 90 TABLET | Refills: 2 | Status: SHIPPED | OUTPATIENT
Start: 2024-11-06 | End: 2025-08-03

## 2024-11-06 RX ORDER — ESCITALOPRAM OXALATE 20 MG/1
20 TABLET ORAL DAILY
Qty: 90 TABLET | Refills: 2 | Status: SHIPPED | OUTPATIENT
Start: 2024-11-06

## 2024-11-06 NOTE — PROGRESS NOTES
Vaccine Information Sheet, \"Influenza - Inactivated\"  given to Gustavo Coyne, or parent/legal guardian of  Gustavo Coyne and verbalized understanding.    Patient responses:    Have you ever had a reaction to a flu vaccine? Yes  Are you able to eat eggs without adverse effects?  Yes  Do you have any current illness?  No  Have you ever had Guillian Dec[place] Syndrome?  No    Flu vaccine given per order. Please see immunization tab.            
tablet Take 1 tablet by mouth 2 times daily (with meals) 60 tablet 0    tiZANidine (ZANAFLEX) 4 MG tablet Take 1 tablet by mouth 3 times daily       No current facility-administered medications for this visit.       Objective    Vitals:    11/06/24 1205   BP: 110/68   Site: Right Upper Arm   Position: Sitting   Cuff Size: Medium Adult   Pulse: 77   Temp: 97.6 °F (36.4 °C)   TempSrc: Temporal   SpO2: 95%   Weight: 91.2 kg (201 lb)   Height: 1.702 m (5' 7.01\")     Physical Exam  Constitutional:       Appearance: Normal appearance.   HENT:      Head: Normocephalic and atraumatic.   Eyes:      Extraocular Movements: Extraocular movements intact.      Conjunctiva/sclera: Conjunctivae normal.      Pupils: Pupils are equal, round, and reactive to light.   Neck:      Thyroid: No thyromegaly.   Cardiovascular:      Rate and Rhythm: Normal rate and regular rhythm.      Heart sounds: Normal heart sounds. No murmur heard.  Pulmonary:      Effort: Pulmonary effort is normal. No respiratory distress.      Breath sounds: Normal breath sounds. No wheezing, rhonchi or rales.   Abdominal:      General: Bowel sounds are normal.   Musculoskeletal:      Cervical back: Normal range of motion and neck supple.   Lymphadenopathy:      Cervical: No cervical adenopathy.   Skin:     Coloration: Skin is not jaundiced or pale.   Neurological:      Mental Status: He is alert and oriented to person, place, and time.   Psychiatric:         Mood and Affect: Mood normal.            Assessment & Plan    Diagnosis Orders   1. Anxiety with depression  escitalopram (LEXAPRO) 20 MG tablet    Will increase Lexapro to 20 mg daily      2. Bruxism      Recommend dental guard      3. Osteoarthritis of spine with radiculopathy, lumbar region  gabapentin (NEURONTIN) 800 MG tablet    Taking gabapentin 800 mg nightly only      4. Neck pain, musculoskeletal  gabapentin (NEURONTIN) 800 MG tablet    Follow-up pain management continue gabapentin May consider

## 2024-11-08 ENCOUNTER — TELEPHONE (OUTPATIENT)
Dept: FAMILY MEDICINE CLINIC | Age: 59
End: 2024-11-08

## 2024-11-08 NOTE — TELEPHONE ENCOUNTER
[facility] called in asking for a clarification on the patient's Gabapentin.    Directions say take one tablet by mouth nightly and then says take one tablet by mouth daily.    Pharmacy requesting a clarification on the directions.    Phone number  [phone]

## 2024-11-11 DIAGNOSIS — R20.2 PARESTHESIA OF LEFT ARM: ICD-10-CM

## 2024-11-11 DIAGNOSIS — M54.2 NECK PAIN, MUSCULOSKELETAL: ICD-10-CM

## 2024-11-11 DIAGNOSIS — M47.26 OSTEOARTHRITIS OF SPINE WITH RADICULOPATHY, LUMBAR REGION: ICD-10-CM

## 2024-11-11 RX ORDER — GABAPENTIN 800 MG/1
800 TABLET ORAL NIGHTLY
Qty: 90 TABLET | Refills: 2 | Status: SHIPPED | OUTPATIENT
Start: 2024-11-11 | End: 2025-08-08

## 2024-11-13 NOTE — TELEPHONE ENCOUNTER
Pharmacy is questioning directions    Directions state 1 tablet by mouth nightly for 270 days    1 daily  or 1 nightly  Please and thank you

## 2024-11-14 ENCOUNTER — HOSPITAL ENCOUNTER (OUTPATIENT)
Dept: PHYSICAL THERAPY | Age: 59
Setting detail: THERAPIES SERIES
Discharge: HOME | End: 2024-11-14
Attending: ORTHOPAEDIC SURGERY
Payer: MEDICAID

## 2024-11-14 PROCEDURE — 97161 PT EVAL LOW COMPLEX 20 MIN: CPT

## 2024-11-14 NOTE — PLAN OF CARE
Physical Therapy Evaluation/Plan of Care   [facility] [place] [facility] - PT  [address]  [place] [address]  Dept: [phone]  Dept Fax: [phone]  Loc: [phone]    Physical Therapy: Initial Evaluation    General Information    Patient: Gustavo Coyne (58 y.o.     male)   Examination Date: 2024   :  1965 ;    Confirmed: Yes MRN: [id number]  CSN: [id number]   Insurance: Payor: [employer] / Plan: [employer] PLAN [place] / Product Type: *No Product type* /   Insurance ID: [id number] - (Medicaid Managed)    Secondary Insurance (if applicable):     Referring Physician: Ino De La Fuente DO       Visits to Date/Visits Approved:     No Show/Cancelled Appts: 0      Medical Diagnosis: Acute medial meniscus tear of right knee, initial encounter [S83.659A]        Treatment Diagnosis: R knee pain, decreased R knee flexion AROM, decreased R LE strength, decreased R SLS stability, decreased activity tolerance, impaired gait, and stairs      SUBJECTIVE:     Onset date: 24     Subjective/ Mechanism of Injury: Pt presents s/p R partial medial and lateral menisectomy 24. Pt reports he was using crutches for 2 days and then was able to slowly progress to a cane and then walk without AD. Pt presents without AD and denies any falls. Pt denies having any issues or drainage from incisions.      Konstantin Pascual #[id number]     Precautions/Contraindications/Restrictions: none           Interventions for current problem: medications , ice, rest      Pain:   Current: 7/10   Best: 3/10   Worst:7/10 (occurs with cold weather, stairs, walking, standing prolonged periods).     Symptom Type / Quality:  pulsating   Location:: Right Knee: anterior     Imaging results (If Applicable): MRI LUMBAR SPINE WO CONTRAST    Result Date: 2024  EXAMINATION: MRI OF THE LUMBAR SPINE WITHOUT CONTRAST, 2024 2:23 am TECHNIQUE:

## 2024-11-25 ENCOUNTER — HOSPITAL ENCOUNTER (OUTPATIENT)
Dept: PHYSICAL THERAPY | Age: 59
Setting detail: THERAPIES SERIES
Discharge: HOME | End: 2024-11-25
Attending: ORTHOPAEDIC SURGERY
Payer: MEDICAID

## 2024-11-25 PROCEDURE — 97110 THERAPEUTIC EXERCISES: CPT

## 2024-11-25 ASSESSMENT — PAIN DESCRIPTION - DESCRIPTORS: DESCRIPTORS: ACHING;TIGHTNESS

## 2024-11-25 ASSESSMENT — PAIN DESCRIPTION - PAIN TYPE: TYPE: CHRONIC PAIN;SURGICAL PAIN

## 2024-11-25 ASSESSMENT — PAIN DESCRIPTION - LOCATION: LOCATION: KNEE

## 2024-11-25 ASSESSMENT — PAIN SCALES - GENERAL: PAINLEVEL_OUTOF10: 3

## 2024-11-25 ASSESSMENT — PAIN DESCRIPTION - ORIENTATION: ORIENTATION: RIGHT

## 2024-11-25 NOTE — PROGRESS NOTES
[facility]  [address] [place] [address]  Phone: [phone]      Date: 2024  Patient: Gustavo Coyne  : 1965   Confirmed: Yes  MRN: [id number]  Referring Provider: Ino De La Fuente DO    Medical Diagnosis: Acute medial meniscus tear of right knee, initial encounter [E47.576G]       Treatment Diagnosis: R knee pain, decreased R knee flexion AROM, decreased R LE strength, decreased R SLS stability, decreased activity tolerance, impaired gait, and stairs    Visit Information:  Insurance: Payor: [employer] / Plan: [employer] PLAN [place] / Product Type: *No Product type* /   PT Visit Information  Total # of Visits Approved: 1 (48 units by 24)  Total # of Visits to Date: 3  Plan of Care/Certification Expiration Date: 25  No Show: 0  Canceled Appointment: 0  Progress Note Counter: - (3/48 units)    Subjective Information:  Subjective:  : Radhames #[id number], Augustus #[id number] I am feeling okay I have a little pain and tightness.  HEP Compliance:  [x] Good [] Fair [] Poor [] Reports not doing due to:    Pain Screening  Patient Currently in Pain: Yes  Pain Level: 3  Pain Type: Chronic pain, Surgical pain  Pain Location: Knee  Pain Orientation: Right  Pain Descriptors: Aching, Tightness    Treatment:  Exercises:  Exercises  Exercise 1: NS vs bike for ROM* NS S 10 for ROM  Exercise 2: heel slides: 5\" x 10 QS during extension phase  Exercise 3: QS*  Exercise 4: SLR 10  Exercise 5: S/L hip abd*  Exercise 6: clams*  Exercise 7: prone hip ext: 10  Exercise 8: Stair training: 3 laps up and down with reciprocal ascending, step to descending.  Exercise 9: single step with weight shift*  Exercise 10: 3 way Rockerboard 15 taps each direction  Exercise 11: Marches: 10 at tower        *Indicates exercise, modality, or manual techniques to be initiated when appropriate    Objective Measures:         LTG 3 Current Status:: 24: R knee

## 2024-11-27 ENCOUNTER — HOSPITAL ENCOUNTER (OUTPATIENT)
Dept: PHYSICAL THERAPY | Age: 59
Setting detail: THERAPIES SERIES
Discharge: HOME | End: 2024-11-27
Attending: ORTHOPAEDIC SURGERY
Payer: MEDICAID

## 2024-11-27 PROCEDURE — 97110 THERAPEUTIC EXERCISES: CPT

## 2024-11-27 PROCEDURE — 97112 NEUROMUSCULAR REEDUCATION: CPT

## 2024-11-27 ASSESSMENT — PAIN DESCRIPTION - DESCRIPTORS: DESCRIPTORS: ACHING

## 2024-11-27 ASSESSMENT — PAIN DESCRIPTION - LOCATION: LOCATION: KNEE

## 2024-11-27 ASSESSMENT — PAIN DESCRIPTION - ORIENTATION: ORIENTATION: RIGHT

## 2024-11-27 ASSESSMENT — PAIN SCALES - GENERAL: PAINLEVEL_OUTOF10: 5

## 2024-11-27 ASSESSMENT — PAIN DESCRIPTION - PAIN TYPE: TYPE: CHRONIC PAIN;SURGICAL PAIN

## 2024-11-27 NOTE — PROGRESS NOTES
Treatment Recommendations: Strengthening, ROM, Balance training, Gait training, Stair training, Neuromuscular re-education, Manual, Home exercise program, Patient/Caregiver education & training, Modalities, Therapeutic activities, Group Therapy  Modalities: Heat/Cold, Ultrasound, E-stim - unattended  Pt to continue current HEP.  See objective section for any therapeutic exercise changes, additions or modifications this date.    Therapy Time:      PT Individual Minutes  Time In: 1035  Time Out: 1114  Minutes: 39  Timed Code Treatment Minutes: 39 Minutes  Procedure Minutes:  Timed Activity Minutes Units   Ther Ex 34 2   Neuro Masha 5 1     Electronically signed by Sylvia Bean PT on 11/27/24 at 11:15 AM EST

## 2024-12-02 ENCOUNTER — HOSPITAL ENCOUNTER (OUTPATIENT)
Dept: PHYSICAL THERAPY | Age: 59
Setting detail: THERAPIES SERIES
Discharge: HOME | End: 2024-12-02
Attending: ORTHOPAEDIC SURGERY
Payer: MEDICAID

## 2024-12-02 PROCEDURE — 97110 THERAPEUTIC EXERCISES: CPT

## 2024-12-02 ASSESSMENT — PAIN SCALES - GENERAL: PAINLEVEL_OUTOF10: 8

## 2024-12-02 ASSESSMENT — PAIN DESCRIPTION - ORIENTATION: ORIENTATION: RIGHT

## 2024-12-02 ASSESSMENT — PAIN DESCRIPTION - LOCATION: LOCATION: KNEE

## 2024-12-02 ASSESSMENT — PAIN DESCRIPTION - DESCRIPTORS: DESCRIPTORS: ACHING

## 2024-12-02 ASSESSMENT — PAIN DESCRIPTION - PAIN TYPE: TYPE: CHRONIC PAIN

## 2024-12-02 NOTE — PROGRESS NOTES
[place] [address] [facility] [place] [address]  Phone: [phone]      Date: 2024  Patient: Gustavo Coyne  : 1965   Confirmed: Yes  MRN: [id number]  Referring Provider: Ino De La Fuente DO    Medical Diagnosis: Acute medial meniscus tear of right knee, initial encounter [C24.177W]       Treatment Diagnosis: R knee pain, decreased R knee flexion AROM, decreased R LE strength, decreased R SLS stability, decreased activity tolerance, impaired gait, and stairs    Visit Information:  Insurance: Payor: [employer] / Plan: [employer] PLAN [place] / Product Type: *No Product type* /   PT Visit Information  Total # of Visits Approved:  (48 units by 24)  Total # of Visits to Date: 4  Plan of Care/Certification Expiration Date: 25  No Show: 0  Canceled Appointment: 0  Progress Note Counter: - (9/48 units)    Subjective Information:  Subjective:  #[id number]:I have a little bit of pain at my knee. I think the weather is making it worse.  HEP Compliance:  [x] Good [] Fair [] Poor [] Reports not doing due to:    Pain Screening  Patient Currently in Pain: Yes  Pain Level: 8  Pain Type: Chronic pain  Pain Location: Knee  Pain Orientation: Right  Pain Descriptors: Aching    Treatment:  Exercises:  Exercises  Exercise 1: StarTrac Bike Seat 7 L4 x6 mins  Exercise 3: bridges x15  Exercise 4: SLR x15  Exercise 5: S/L hip abd x15  Exercise 6: clams*  Exercise 7: prone hip ext: 10-15  Exercise 8: step ups 8\" x10  F/L W/ march, eccentric lowering 4\" x 10  Exercise 9: standing knee flexion stretch on step 5\"x10  Exercise 10: 3 way Rockerboard 15 taps each direction  Exercise 11: Marches: 15 focus on SLS 5\" holds on each step  Exercise 12: SLS 30\"x3  Exercise 13: Sled push pull x 6 laps 15ft        *Indicates exercise, modality, or manual techniques to be initiated when appropriate    Objective Measures:      LTG 2 Current Status:: 24 Reports

## 2024-12-04 ENCOUNTER — HOSPITAL ENCOUNTER (OUTPATIENT)
Dept: PHYSICAL THERAPY | Age: 59
Setting detail: THERAPIES SERIES
Discharge: HOME | End: 2024-12-04
Attending: ORTHOPAEDIC SURGERY
Payer: MEDICAID

## 2024-12-04 ENCOUNTER — TELEPHONE (OUTPATIENT)
Dept: FAMILY MEDICINE CLINIC | Age: 59
End: 2024-12-04

## 2024-12-04 PROCEDURE — 97110 THERAPEUTIC EXERCISES: CPT

## 2024-12-04 ASSESSMENT — PAIN SCALES - GENERAL: PAINLEVEL_OUTOF10: 6

## 2024-12-04 ASSESSMENT — PAIN DESCRIPTION - DESCRIPTORS: DESCRIPTORS: ACHING;SORE

## 2024-12-04 ASSESSMENT — PAIN DESCRIPTION - LOCATION: LOCATION: KNEE

## 2024-12-04 ASSESSMENT — PAIN DESCRIPTION - ORIENTATION: ORIENTATION: RIGHT

## 2024-12-04 ASSESSMENT — PAIN DESCRIPTION - PAIN TYPE: TYPE: CHRONIC PAIN

## 2024-12-04 NOTE — PROGRESS NOTES
Plan:  Frequency/Duration:  Plan  Plan Frequency: 2  Plan weeks: 4-6  Current Treatment Recommendations: Strengthening, ROM, Balance training, Gait training, Stair training, Neuromuscular re-education, Manual, Home exercise program, Patient/Caregiver education & training, Modalities, Therapeutic activities, Group Therapy  Modalities: Heat/Cold, Ultrasound, E-stim - unattended  Pt to continue current HEP.  See objective section for any therapeutic exercise changes, additions or modifications this date.    Therapy Time:      PT Individual Minutes  Time In: 0840  Time Out: 0919  Minutes: 39  Timed Code Treatment Minutes: 39 Minutes  Procedure Minutes:0  Timed Activity Minutes Units   Ther Ex 39 3     Electronically signed by Ino Ryan PTA on 12/4/24 at 9:38 AM EST

## 2024-12-05 RX ORDER — VENLAFAXINE HYDROCHLORIDE 37.5 MG/1
37.5 CAPSULE, EXTENDED RELEASE ORAL DAILY
Qty: 90 CAPSULE | Refills: 1 | Status: SHIPPED | OUTPATIENT
Start: 2024-12-05

## 2024-12-09 ENCOUNTER — HOSPITAL ENCOUNTER (OUTPATIENT)
Dept: PHYSICAL THERAPY | Age: 59
Setting detail: THERAPIES SERIES
Discharge: HOME | End: 2024-12-09
Attending: ORTHOPAEDIC SURGERY
Payer: MEDICAID

## 2024-12-09 PROCEDURE — 97110 THERAPEUTIC EXERCISES: CPT

## 2024-12-09 ASSESSMENT — PAIN DESCRIPTION - LOCATION: LOCATION: KNEE

## 2024-12-09 ASSESSMENT — PAIN DESCRIPTION - ORIENTATION: ORIENTATION: RIGHT

## 2024-12-09 ASSESSMENT — PAIN DESCRIPTION - PAIN TYPE: TYPE: CHRONIC PAIN

## 2024-12-09 ASSESSMENT — PAIN DESCRIPTION - DESCRIPTORS: DESCRIPTORS: ACHING;SORE

## 2024-12-09 ASSESSMENT — PAIN SCALES - GENERAL: PAINLEVEL_OUTOF10: 3

## 2024-12-09 NOTE — PROGRESS NOTES
[facility]  [address] [place] [address]  Phone: [phone]      Date: 2024  Patient: Gustavo Coyne  : 1965   Confirmed: Yes  MRN: [id number]  Referring Provider: Ino De La Fuente DO    Medical Diagnosis: Acute medial meniscus tear of right knee, initial encounter [N72.093S]       Treatment Diagnosis: R knee pain, decreased R knee flexion AROM, decreased R LE strength, decreased R SLS stability, decreased activity tolerance, impaired gait, and stairs    Visit Information:  Insurance: Payor: [employer] / Plan: [employer] PLAN [place] / Product Type: *No Product type* /   PT Visit Information  Total # of Visits Approved:  (48 units by 24)  Total # of Visits to Date: 5  Plan of Care/Certification Expiration Date: 25  No Show: 0  Canceled Appointment: 0  Progress Note Counter: - (15/48 units)    Subjective Information:  Subjective:  #[id number] Shira. Just a little bit of pain on that right knee today. Pt reporting 3/10 currently. I had more pain following my last visit from doing a little more than I had done before.  HEP Compliance:  [x] Good [] Fair [] Poor [] Reports not doing due to:    Pain Screening  Patient Currently in Pain: Yes  Pain Assessment: 0-10  Pain Level: 3  Pain Type: Chronic pain  Pain Location: Knee  Pain Orientation: Right  Pain Descriptors: Aching, Sore    Treatment:  Exercises:  Exercises  Exercise 1: StarTrac Bike Seat  L5 x8 mins  Exercise 3: bridges 5\"  x15  Exercise 4: SLR x15  Exercise 5: S/L hip abd x15  Exercise 6: clams x 15  Exercise 8: step ups 8\" x10  F/L W/ march, eccentric lowering 4\" x 10  Exercise 12: SLS 20\"x5 on AirEx foam.  Exercise 15: Apollo Leg press 10# x 10, 20# x 10 30# x 10 (30# comfortable weight for next visit)       Objective Measures:              LTG 2 Current Status:: 24 Reports good compliance with HEP                   Assessment:   Body Structures, Functions,

## 2024-12-10 ENCOUNTER — OFFICE VISIT (OUTPATIENT)
Dept: ORTHOPEDIC SURGERY | Age: 59
End: 2024-12-10

## 2024-12-10 VITALS
BODY MASS INDEX: 31.55 KG/M2 | HEIGHT: 67 IN | WEIGHT: 201 LBS | TEMPERATURE: 97.6 F | HEART RATE: 76 BPM | OXYGEN SATURATION: 95 %

## 2024-12-10 DIAGNOSIS — S83.241A ACUTE MEDIAL MENISCUS TEAR OF RIGHT KNEE, INITIAL ENCOUNTER: Primary | ICD-10-CM

## 2024-12-10 PROCEDURE — 99024 POSTOP FOLLOW-UP VISIT: CPT | Performed by: ORTHOPAEDIC SURGERY

## 2024-12-10 NOTE — PROGRESS NOTES
Subjective:      Patient ID: Gustavo Coyne is a 58 y.o. male who presents today for:  Chief Complaint   Patient presents with    Follow-up     Pt present today for 6 week fu rt knee. Pt states that there are some days were pain is present, Pt is currently in PT.          Subjective/Objective/Assessment/Plan:     SUBJECTIVE -the patient had right knee arthroscopy on 9/18/2024.    OBJECTIVE -incision is well-approximated.  No signs of infection.  Range of motion to full extension 210 degrees of flexion.    MRI LUMBAR SPINE WO CONTRAST  Narrative: EXAMINATION:  MRI OF THE LUMBAR SPINE WITHOUT CONTRAST, 11/5/2024 2:23 am    TECHNIQUE:  Multiplanar multisequence MRI of the lumbar spine was performed without the  administration of intravenous contrast.    COMPARISON:  Lumbar radiographs 04/23/2024.    HISTORY:  ORDERING SYSTEM PROVIDED HISTORY: Low back pain, unspecified back pain  laterality, unspecified chronicity, unspecified whether sciatica present,  Paresthesia  TECHNOLOGIST PROVIDED HISTORY:  What reading provider will be dictating this exam?->CRC    FINDINGS:  BONES/ALIGNMENT: There is normal alignment of the spine. The vertebral body  heights are maintained. The bone marrow signal appears unremarkable.    SPINAL CORD: The conus terminates normally.    SOFT TISSUES: No paraspinal mass identified.    L1-L2: Trace circumferential disc bulge.  Mild spinal canal narrowing.  No  high-grade foraminal narrowing.    L2-L3: Trace circumferential disc bulge.  No high-grade spinal canal or  foraminal narrowing.    L3-L4: Trace circumferential disc bulge and mild bilateral facet hypertrophy.  No high-grade spinal canal foraminal narrowing.    L4-L5: Small circumferential disc bulge and mild bilateral facet hypertrophy.  Mild spinal canal narrowing.  Mild right and left foraminal narrowing.    L5-S1: Mild bilateral facet hypertrophy.  No high-grade spinal canal or  foraminal narrowing.  Impression: Mild degeneration

## 2024-12-11 ENCOUNTER — HOSPITAL ENCOUNTER (OUTPATIENT)
Dept: PHYSICAL THERAPY | Age: 59
Setting detail: THERAPIES SERIES
Discharge: HOME | End: 2024-12-11
Attending: ORTHOPAEDIC SURGERY
Payer: MEDICAID

## 2024-12-11 PROCEDURE — 97110 THERAPEUTIC EXERCISES: CPT

## 2024-12-11 ASSESSMENT — PAIN DESCRIPTION - DESCRIPTORS: DESCRIPTORS: ACHING;SORE

## 2024-12-11 ASSESSMENT — PAIN SCALES - GENERAL: PAINLEVEL_OUTOF10: 4

## 2024-12-11 ASSESSMENT — PAIN DESCRIPTION - ORIENTATION: ORIENTATION: RIGHT

## 2024-12-11 ASSESSMENT — PAIN DESCRIPTION - LOCATION: LOCATION: KNEE

## 2024-12-11 ASSESSMENT — PAIN DESCRIPTION - PAIN TYPE: TYPE: CHRONIC PAIN

## 2024-12-11 NOTE — PROGRESS NOTES
[address] [place] [address]  Phone: [phone]      Date: 2024  Patient: Gustavo Coyne  : 1965   Confirmed: Yes  MRN: [id number]  Referring Provider: Ino De La Fuente DO    Medical Diagnosis: Acute medial meniscus tear of right knee, initial encounter [T77.975D]       Treatment Diagnosis: R knee pain, decreased R knee flexion AROM, decreased R LE strength, decreased R SLS stability, decreased activity tolerance, impaired gait, and stairs    Visit Information:  Insurance: Payor: [employer] PL / Plan: [employer] PLAN [place] / Product Type: *No Product type* /   PT Visit Information  Total # of Visits Approved:  (48 units by 24)  Total # of Visits to Date: 6  Plan of Care/Certification Expiration Date: 25  No Show: 0  Canceled Appointment: 0  Progress Note Counter: - (18/48 units)    Subjective Information:  Subjective:  #[id number] Geoff. Pt reports feeling a little pain today.  Pt is feeling better with PT, reports he is doing much better with bending his knees.  HEP Compliance:  [x] Good [] Fair [] Poor [] Reports not doing due to:    Pain Screening  Patient Currently in Pain: Yes  Pain Assessment: 0-10  Pain Level: 4  Pain Type: Chronic pain  Pain Location: Knee  Pain Orientation: Right  Pain Descriptors: Aching, Sore    Treatment:  Exercises:  Exercises  Exercise 1: StarTrac Bike Seat  L6 x8 mins  Exercise 4: SLR x20  Exercise 15: Apollo Leg press: 30# x 10  Exercise 16: Objective Measures completed       *Indicates exercise, modality, or manual techniques to be initiated when appropriate    Objective Measures:        LTG 1 Current Status:: : LEFS  LTG 2 Current Status:: 24 Reports good compliance with HEP  LTG 3 Current Status:: 24: R knee AROM 0-140* in supine; reciprocal stairs with B HR's  LTG 4 Current Status:: 24: R SLS 30\"  LTG 5 Current Status:: : Strength RLE  Comment: 
hip IR 5/5 ext 5/5   Met     Body Structures, Functions, Activity Limitations Requiring Skilled Therapeutic Intervention: Decreased functional mobility , Decreased ADL status, Decreased endurance, Increased pain, Decreased ROM, Decreased strength, Decreased posture, Decreased balance  Assessment: Pt shows up today for PT and improves intensity for star trac bicycle.  Pt shows a significant improvement with BLE strength and is 5/5 in each muscle tested.  He also shows signfiicant improvement w/ LEFS as he is able toimprove by 15 points from 40 to 55.  Pt has been able to meet his other goals as well.  Due to this, patient will be put on hold for 30 days to trial IND HEP.  Therapy Prognosis: Good       PLAN: [x] Hold for 30 days to trial IND HEP  Frequency/Duration:  Plan Frequency: 2  Plan weeks: 4-6  Current Treatment Recommendations: Strengthening, ROM, Balance training, Gait training, Stair training, Neuromuscular re-education, Manual, Home exercise program, Patient/Caregiver education & training, Modalities, Therapeutic activities, Group Therapy  Modalities: Heat/Cold, Ultrasound, E-stim - unattended          Patient Status:[] Continue/ Initiate plan of Care     [] Discharge PT.  Recommend pt continue with HEP.      [] Additional visits requested, Please re-certify for additional visits:     [x] Hold        Signature: Electronically signed by Noé Segovia PT on 12/11/24 at 8:55 AM EST      If you have any questions or concerns, please don't hesitate to call.  Thank you for your referral.

## 2024-12-18 ENCOUNTER — OFFICE VISIT (OUTPATIENT)
Age: 59
End: 2024-12-18
Payer: MEDICAID

## 2024-12-18 VITALS
OXYGEN SATURATION: 95 % | HEIGHT: 67 IN | HEART RATE: 70 BPM | SYSTOLIC BLOOD PRESSURE: 122 MMHG | TEMPERATURE: 97.8 F | BODY MASS INDEX: 31.71 KG/M2 | DIASTOLIC BLOOD PRESSURE: 70 MMHG | WEIGHT: 202 LBS

## 2024-12-18 DIAGNOSIS — K21.9 GASTROESOPHAGEAL REFLUX DISEASE, UNSPECIFIED WHETHER ESOPHAGITIS PRESENT: ICD-10-CM

## 2024-12-18 DIAGNOSIS — F41.8 ANXIETY WITH DEPRESSION: Primary | ICD-10-CM

## 2024-12-18 DIAGNOSIS — M47.26 OSTEOARTHRITIS OF SPINE WITH RADICULOPATHY, LUMBAR REGION: ICD-10-CM

## 2024-12-18 DIAGNOSIS — Z87.19 HISTORY OF MELENA: ICD-10-CM

## 2024-12-18 PROCEDURE — PBSHW PBB SHADOW CHARGE: Performed by: PHYSICIAN ASSISTANT

## 2024-12-18 PROCEDURE — G8427 DOCREV CUR MEDS BY ELIG CLIN: HCPCS | Performed by: PHYSICIAN ASSISTANT

## 2024-12-18 PROCEDURE — G8417 CALC BMI ABV UP PARAM F/U: HCPCS | Performed by: PHYSICIAN ASSISTANT

## 2024-12-18 PROCEDURE — 1036F TOBACCO NON-USER: CPT | Performed by: PHYSICIAN ASSISTANT

## 2024-12-18 PROCEDURE — G8484 FLU IMMUNIZE NO ADMIN: HCPCS | Performed by: PHYSICIAN ASSISTANT

## 2024-12-18 PROCEDURE — 99213 OFFICE O/P EST LOW 20 MIN: CPT | Performed by: PHYSICIAN ASSISTANT

## 2024-12-18 PROCEDURE — 3017F COLORECTAL CA SCREEN DOC REV: CPT | Performed by: PHYSICIAN ASSISTANT

## 2024-12-18 RX ORDER — PANTOPRAZOLE SODIUM 40 MG/1
40 TABLET, DELAYED RELEASE ORAL
Qty: 90 TABLET | Refills: 1 | Status: SHIPPED | OUTPATIENT
Start: 2024-12-18

## 2024-12-18 NOTE — PROGRESS NOTES
whether esophagitis present  pantoprazole (PROTONIX) 40 MG tablet    Limit use of diclofenac- start  pantoprazole 40mg consider gi      3. History of melena      consider egd      4. Osteoarthritis of spine with radiculopathy, lumbar region  Ambulatory referral to Physical Therapy            Orders Placed This Encounter   Procedures    Ambulatory referral to Physical Therapy     Referral Priority:   Routine     Referral Type:   Eval and Treat     Referral Reason:   Specialty Services Required     Requested Specialty:   Physical Therapy     Number of Visits Requested:   1    Amb External Referral To Psychiatry     Referral Priority:   Routine     Referral Type:   Eval and Treat     Referral Reason:   Specialty Services Required     Requested Specialty:   Psychiatry     Number of Visits Requested:   1     Orders Placed This Encounter   Medications    pantoprazole (PROTONIX) 40 MG tablet     Sig: Take 1 tablet by mouth every morning (before breakfast)     Dispense:  90 tablet     Refill:  1     Medications Discontinued During This Encounter   Medication Reason    escitalopram (LEXAPRO) 20 MG tablet LIST CLEANUP     Return in about 2 months (around 2/18/2025), or if symptoms worsen or fail to improve.    Kaycee Pal PA-C

## 2025-01-21 ENCOUNTER — OFFICE VISIT (OUTPATIENT)
Dept: ORTHOPEDIC SURGERY | Age: 60
End: 2025-01-21

## 2025-01-21 DIAGNOSIS — S83.241A ACUTE MEDIAL MENISCUS TEAR OF RIGHT KNEE, INITIAL ENCOUNTER: Primary | ICD-10-CM

## 2025-01-21 PROCEDURE — 99024 POSTOP FOLLOW-UP VISIT: CPT | Performed by: ORTHOPAEDIC SURGERY

## 2025-01-21 NOTE — PROGRESS NOTES
encounter                PLAN -he is happy of these had the surgery.  He does feel much better.  He is not having hardly any pain.  Occasionally he will feel pain but he feels it is from the cold.  At this point he can go back to see me on an as-needed basis.  He has no further complaints or concerns.    --------------------------------------------------------------------------------------------------------------  Past Medical History:   Diagnosis Date    Degenerative joint disease (DJD) of lumbar spine 2010    History of 2019 novel coronavirus disease (COVID-19) 2023    Prediabetes 11/2023     --------------------------------------------------------------------------------------------------------------  Past Surgical History:   Procedure Laterality Date    COLONOSCOPY N/A 6/6/2024    COLORECTAL CANCER SCREENING, NOT HIGH RISK performed by Yesenia To MD at [facility]    KNEE ARTHROSCOPY Right 9/18/2024    Right knee arthroscopy with partial medial and lateral meniscectomy performed by Ino De La Fuente DO at [facility] [place] [facility]     --------------------------------------------------------------------------------------------------------------    Tobacco Use      Smoking status: Never        Passive exposure: Never      Smokeless tobacco: Never     reports no history of drug use.  --------------------------------------------------------------------------------------------------------------  Allergies   Allergen Reactions    Lexapro [Escitalopram]      Abdominal pain.      --------------------------------------------------------------------------------------------------------------    Current Outpatient Medications:     pantoprazole (PROTONIX) 40 MG tablet, Take 1 tablet by mouth every morning (before breakfast), Disp: 90 tablet, Rfl: 1    venlafaxine (EFFEXOR XR) 37.5 MG extended release capsule, Take 1 capsule by mouth daily, Disp: 90 capsule, Rfl: 1    gabapentin (NEURONTIN) 800 MG tablet, Take 1 tablet by mouth nightly

## 2025-03-03 ENCOUNTER — OFFICE VISIT (OUTPATIENT)
Age: 60
End: 2025-03-03
Payer: MEDICAID

## 2025-03-03 VITALS
WEIGHT: 204 LBS | HEIGHT: 67 IN | BODY MASS INDEX: 32.02 KG/M2 | SYSTOLIC BLOOD PRESSURE: 128 MMHG | DIASTOLIC BLOOD PRESSURE: 80 MMHG | TEMPERATURE: 97.6 F | HEART RATE: 65 BPM | OXYGEN SATURATION: 96 %

## 2025-03-03 DIAGNOSIS — K21.9 GASTROESOPHAGEAL REFLUX DISEASE, UNSPECIFIED WHETHER ESOPHAGITIS PRESENT: ICD-10-CM

## 2025-03-03 DIAGNOSIS — F41.8 ANXIETY WITH DEPRESSION: Primary | ICD-10-CM

## 2025-03-03 DIAGNOSIS — M17.11 PRIMARY OSTEOARTHRITIS OF RIGHT KNEE: ICD-10-CM

## 2025-03-03 DIAGNOSIS — J34.89 RHINORRHEA: ICD-10-CM

## 2025-03-03 DIAGNOSIS — M47.26 OSTEOARTHRITIS OF SPINE WITH RADICULOPATHY, LUMBAR REGION: ICD-10-CM

## 2025-03-03 PROCEDURE — G8417 CALC BMI ABV UP PARAM F/U: HCPCS | Performed by: PHYSICIAN ASSISTANT

## 2025-03-03 PROCEDURE — G8427 DOCREV CUR MEDS BY ELIG CLIN: HCPCS | Performed by: PHYSICIAN ASSISTANT

## 2025-03-03 PROCEDURE — 1036F TOBACCO NON-USER: CPT | Performed by: PHYSICIAN ASSISTANT

## 2025-03-03 PROCEDURE — 3017F COLORECTAL CA SCREEN DOC REV: CPT | Performed by: PHYSICIAN ASSISTANT

## 2025-03-03 PROCEDURE — 99213 OFFICE O/P EST LOW 20 MIN: CPT | Performed by: PHYSICIAN ASSISTANT

## 2025-03-03 PROCEDURE — 99214 OFFICE O/P EST MOD 30 MIN: CPT | Performed by: PHYSICIAN ASSISTANT

## 2025-03-03 RX ORDER — LORATADINE 10 MG/1
10 TABLET ORAL DAILY
Qty: 90 TABLET | Refills: 1 | Status: SHIPPED | OUTPATIENT
Start: 2025-03-03

## 2025-03-03 SDOH — ECONOMIC STABILITY: FOOD INSECURITY: WITHIN THE PAST 12 MONTHS, YOU WORRIED THAT YOUR FOOD WOULD RUN OUT BEFORE YOU GOT MONEY TO BUY MORE.: NEVER TRUE

## 2025-03-03 SDOH — ECONOMIC STABILITY: FOOD INSECURITY: WITHIN THE PAST 12 MONTHS, THE FOOD YOU BOUGHT JUST DIDN'T LAST AND YOU DIDN'T HAVE MONEY TO GET MORE.: NEVER TRUE

## 2025-03-03 ASSESSMENT — PATIENT HEALTH QUESTIONNAIRE - PHQ9
SUM OF ALL RESPONSES TO PHQ QUESTIONS 1-9: 0
1. LITTLE INTEREST OR PLEASURE IN DOING THINGS: NOT AT ALL
10. IF YOU CHECKED OFF ANY PROBLEMS, HOW DIFFICULT HAVE THESE PROBLEMS MADE IT FOR YOU TO DO YOUR WORK, TAKE CARE OF THINGS AT HOME, OR GET ALONG WITH OTHER PEOPLE: NOT DIFFICULT AT ALL
5. POOR APPETITE OR OVEREATING: NOT AT ALL
8. MOVING OR SPEAKING SO SLOWLY THAT OTHER PEOPLE COULD HAVE NOTICED. OR THE OPPOSITE, BEING SO FIGETY OR RESTLESS THAT YOU HAVE BEEN MOVING AROUND A LOT MORE THAN USUAL: NOT AT ALL
3. TROUBLE FALLING OR STAYING ASLEEP: NOT AT ALL
SUM OF ALL RESPONSES TO PHQ QUESTIONS 1-9: 0
2. FEELING DOWN, DEPRESSED OR HOPELESS: NOT AT ALL
7. TROUBLE CONCENTRATING ON THINGS, SUCH AS READING THE NEWSPAPER OR WATCHING TELEVISION: NOT AT ALL
9. THOUGHTS THAT YOU WOULD BE BETTER OFF DEAD, OR OF HURTING YOURSELF: NOT AT ALL
4. FEELING TIRED OR HAVING LITTLE ENERGY: NOT AT ALL

## 2025-03-03 ASSESSMENT — ENCOUNTER SYMPTOMS: RHINORRHEA: 1

## 2025-03-03 NOTE — PROGRESS NOTES
referral to Physical Therapy    Voltaren as needed will resume PT      3. Osteoarthritis of spine with radiculopathy, lumbar region      Voltaren as needed      4. Rhinorrhea      Trial Claritin 10 mg daily for 7 to 10 days resume as needed      5. Gastroesophageal reflux disease, unspecified whether esophagitis present      Resume pantoprazole            Orders Placed This Encounter   Procedures    Ambulatory referral to Physical Therapy     Referral Priority:   Routine     Referral Type:   Eval and Treat     Referral Reason:   Specialty Services Required     Requested Specialty:   Physical Therapy     Number of Visits Requested:   1     Orders Placed This Encounter   Medications    loratadine (CLARITIN) 10 MG tablet     Sig: Take 1 tablet by mouth daily     Dispense:  90 tablet     Refill:  1     Medications Discontinued During This Encounter   Medication Reason    diclofenac (VOLTAREN) 50 MG EC tablet LIST CLEANUP     Return in about 6 months (around 9/3/2025).    Kaycee Pal PA-C

## 2025-06-18 ENCOUNTER — OFFICE VISIT (OUTPATIENT)
Age: 60
End: 2025-06-18
Payer: MEDICAID

## 2025-06-18 VITALS
SYSTOLIC BLOOD PRESSURE: 110 MMHG | TEMPERATURE: 97.6 F | DIASTOLIC BLOOD PRESSURE: 78 MMHG | BODY MASS INDEX: 32.65 KG/M2 | HEIGHT: 67 IN | OXYGEN SATURATION: 95 % | WEIGHT: 208 LBS | HEART RATE: 88 BPM

## 2025-06-18 DIAGNOSIS — M17.11 PRIMARY OSTEOARTHRITIS OF RIGHT KNEE: ICD-10-CM

## 2025-06-18 DIAGNOSIS — L98.9 SCALP LESION: Primary | ICD-10-CM

## 2025-06-18 DIAGNOSIS — F41.8 ANXIETY WITH DEPRESSION: ICD-10-CM

## 2025-06-18 DIAGNOSIS — M47.26 OSTEOARTHRITIS OF SPINE WITH RADICULOPATHY, LUMBAR REGION: ICD-10-CM

## 2025-06-18 PROCEDURE — 99212 OFFICE O/P EST SF 10 MIN: CPT | Performed by: PHYSICIAN ASSISTANT

## 2025-06-18 PROCEDURE — 99213 OFFICE O/P EST LOW 20 MIN: CPT | Performed by: PHYSICIAN ASSISTANT

## 2025-06-18 RX ORDER — FLUOCINOLONE ACETONIDE 0.1 MG/ML
SOLUTION TOPICAL
Qty: 60 ML | Refills: 3 | Status: SHIPPED | OUTPATIENT
Start: 2025-06-18

## 2025-06-18 ASSESSMENT — ENCOUNTER SYMPTOMS: BACK PAIN: 1

## 2025-06-18 NOTE — PROGRESS NOTES
(FLEXERIL) 10 MG tablet       tiZANidine (ZANAFLEX) 4 MG tablet Take 1 tablet by mouth 3 times daily       No current facility-administered medications for this visit.       Objective    Vitals:    06/18/25 0914   BP: 110/78   BP Site: Left Upper Arm   Patient Position: Sitting   BP Cuff Size: Medium Adult   Pulse: 88   Temp: 97.6 °F (36.4 °C)   TempSrc: Temporal   SpO2: 95%   Weight: 94.3 kg (208 lb)   Height: 1.702 m (5' 7.01\")     Physical Exam  Constitutional:       Appearance: Normal appearance.   HENT:      Head: Normocephalic and atraumatic.   Eyes:      Extraocular Movements: Extraocular movements intact.      Conjunctiva/sclera: Conjunctivae normal.      Pupils: Pupils are equal, round, and reactive to light.   Neck:      Thyroid: No thyromegaly.   Pulmonary:      Effort: Pulmonary effort is normal. No respiratory distress.   Musculoskeletal:         General: Normal range of motion.      Cervical back: Normal range of motion.   Lymphadenopathy:      Cervical: No cervical adenopathy.   Skin:     General: Skin is warm and dry.      Findings: Lesion present.      Comments: Raised scar occipital scalp few papular lesions throughout the scalp   Neurological:      Mental Status: He is alert and oriented to person, place, and time.      Motor: No weakness.      Coordination: Coordination normal.      Gait: Gait normal.   Psychiatric:         Mood and Affect: Mood normal.         Thought Content: Thought content normal.         Judgment: Judgment normal.            Assessment & Plan     ICD-10-CM    1. Scalp lesion  L98.9 fluocinolone acetonide (SYNALAR) 0.01 % external solution     Salicylic Acid 3 % SHAM     [facility] - Ansley Calderon PA-C, Dermatology, [place]      2. Anxiety with depression  F41.8     Stable on Effexor      3. Primary osteoarthritis of right knee  M17.11     Management Voltaren      4. Osteoarthritis of spine with radiculopathy, lumbar region  M47.26     Managed with Voltaren and gabapentin

## 2025-09-03 ENCOUNTER — OFFICE VISIT (OUTPATIENT)
Age: 60
End: 2025-09-03
Payer: MEDICAID

## 2025-09-03 VITALS
HEART RATE: 91 BPM | BODY MASS INDEX: 32.8 KG/M2 | DIASTOLIC BLOOD PRESSURE: 80 MMHG | SYSTOLIC BLOOD PRESSURE: 120 MMHG | TEMPERATURE: 97.6 F | OXYGEN SATURATION: 96 % | WEIGHT: 209 LBS | HEIGHT: 67 IN

## 2025-09-03 DIAGNOSIS — M47.26 OSTEOARTHRITIS OF SPINE WITH RADICULOPATHY, LUMBAR REGION: Primary | ICD-10-CM

## 2025-09-03 DIAGNOSIS — M25.561 CHRONIC PAIN OF RIGHT KNEE: ICD-10-CM

## 2025-09-03 DIAGNOSIS — Z13.1 ENCOUNTER FOR SCREENING EXAMINATION FOR IMPAIRED GLUCOSE REGULATION AND DIABETES MELLITUS: ICD-10-CM

## 2025-09-03 DIAGNOSIS — Z12.5 PROSTATE CANCER SCREENING: ICD-10-CM

## 2025-09-03 DIAGNOSIS — K21.9 GASTROESOPHAGEAL REFLUX DISEASE, UNSPECIFIED WHETHER ESOPHAGITIS PRESENT: ICD-10-CM

## 2025-09-03 DIAGNOSIS — F41.8 ANXIETY WITH DEPRESSION: ICD-10-CM

## 2025-09-03 DIAGNOSIS — Z13.220 LIPID SCREENING: ICD-10-CM

## 2025-09-03 DIAGNOSIS — G89.29 CHRONIC PAIN OF RIGHT KNEE: ICD-10-CM

## 2025-09-03 DIAGNOSIS — Z13.0 SCREENING FOR IRON DEFICIENCY ANEMIA: ICD-10-CM

## 2025-09-03 DIAGNOSIS — M54.2 NECK PAIN, MUSCULOSKELETAL: ICD-10-CM

## 2025-09-03 PROCEDURE — 99213 OFFICE O/P EST LOW 20 MIN: CPT | Performed by: PHYSICIAN ASSISTANT

## 2025-09-03 PROCEDURE — 99214 OFFICE O/P EST MOD 30 MIN: CPT | Performed by: PHYSICIAN ASSISTANT

## 2025-09-03 RX ORDER — PANTOPRAZOLE SODIUM 40 MG/1
40 TABLET, DELAYED RELEASE ORAL
Qty: 90 TABLET | Refills: 2 | Status: SHIPPED | OUTPATIENT
Start: 2025-09-03

## 2025-09-03 RX ORDER — GABAPENTIN 800 MG/1
800 TABLET ORAL NIGHTLY
Qty: 90 TABLET | Refills: 2 | Status: SHIPPED | OUTPATIENT
Start: 2025-09-03 | End: 2026-05-31

## 2025-09-03 RX ORDER — VENLAFAXINE HYDROCHLORIDE 37.5 MG/1
37.5 CAPSULE, EXTENDED RELEASE ORAL DAILY
Qty: 90 CAPSULE | Refills: 2 | Status: SHIPPED | OUTPATIENT
Start: 2025-09-03

## 2025-09-03 ASSESSMENT — ENCOUNTER SYMPTOMS: BACK PAIN: 1

## (undated) DEVICE — STOCKINETTE,IMPERVIOUS,12X48,STERILE: Brand: MEDLINE

## (undated) DEVICE — TUBE IRRIG L8IN LNG PT W/ CONN FOR PMP SYS REDEUCE

## (undated) DEVICE — NEEDLE SPNL 18GA L3.5IN W/ QNCKE SHARPER BVL DURA CLICK

## (undated) DEVICE — LOWER EXTREMITY: Brand: MEDLINE INDUSTRIES, INC.

## (undated) DEVICE — SYRINGE MED 30ML STD CLR PLAS LUERLOCK TIP N CTRL DISP

## (undated) DEVICE — BANDAGE COMPR W6INXL5YD WHT BGE POLY COT M E WRP WV HK AND

## (undated) DEVICE — PADDING CAST W6INXL4YD RAYON UNDERCAST SOF-ROL

## (undated) DEVICE — HYPODERMIC SAFETY NEEDLE: Brand: MAGELLAN

## (undated) DEVICE — Device: Brand: ENDO SMARTCAP

## (undated) DEVICE — SINGLE PORT MANIFOLD: Brand: NEPTUNE 2

## (undated) DEVICE — TUBE SET 96 MM 64 MM H2O PERISTALTIC STD AUX CHANNEL

## (undated) DEVICE — TUBING, SUCTION, 9/32" X 12', STRAIGHT: Brand: MEDLINE INDUSTRIES, INC.

## (undated) DEVICE — BANDAGE COBAN 4 IN COMPR W4INXL5YD FOAM COHESIVE QUIK STK SELF ADH SFT

## (undated) DEVICE — DRESSING GZ W1XL8IN COT XRFRM N ADH OVERWRAP CURAD

## (undated) DEVICE — BRUSH ENDO CLN L90.5IN SHTH DIA1.7MM BRIST DIA5-7MM 2-6MM

## (undated) DEVICE — WAND ABLAT P 50 HND CTRL VAPR

## (undated) DEVICE — BLADE,CARBON-STEEL,11,STRL,DISPOSABLE,TB: Brand: MEDLINE

## (undated) DEVICE — GLOVE ORANGE PI 8 1/2   MSG9085

## (undated) DEVICE — TUBING, SUCTION, 1/4" X 10', STRAIGHT: Brand: MEDLINE

## (undated) DEVICE — ENDO CARRY-ON PROCEDURE KIT: Brand: ENDO CARRY-ON PROCEDURE KIT

## (undated) DEVICE — BANDAGE,ELASTIC,ESMARK,STERILE,6"X9',LF: Brand: MEDLINE

## (undated) DEVICE — PAD,ABDOMINAL,8"X10",ST,LF: Brand: MEDLINE

## (undated) DEVICE — PADDING CAST W6INXL4YD POLY POR SPUN DACRON NON STERILE

## (undated) DEVICE — [TOMCAT CUTTER, ARTHROSCOPIC SHAVER BLADE,  DO NOT RESTERILIZE,  DO NOT USE IF PACKAGE IS DAMAGED,  KEEP DRY,  KEEP AWAY FROM SUNLIGHT]: Brand: FORMULA

## (undated) DEVICE — 4-PORT MANIFOLD: Brand: NEPTUNE 2

## (undated) DEVICE — GOWN,SIRUS,POLYRNF,BRTHSLV,XLN/XL,20/CS: Brand: MEDLINE

## (undated) DEVICE — SUTURE ETHILON SZ 3-0 L18IN NONABSORBABLE BLK PS-2 L19MM 3/8 1669H